# Patient Record
Sex: MALE | Race: WHITE | NOT HISPANIC OR LATINO | Employment: OTHER | URBAN - METROPOLITAN AREA
[De-identification: names, ages, dates, MRNs, and addresses within clinical notes are randomized per-mention and may not be internally consistent; named-entity substitution may affect disease eponyms.]

---

## 2021-01-01 ENCOUNTER — APPOINTMENT (OUTPATIENT)
Dept: CT IMAGING | Facility: CLINIC | Age: 71
End: 2021-01-01
Attending: EMERGENCY MEDICINE
Payer: COMMERCIAL

## 2021-01-01 ENCOUNTER — APPOINTMENT (OUTPATIENT)
Dept: GENERAL RADIOLOGY | Facility: CLINIC | Age: 71
End: 2021-01-01
Attending: EMERGENCY MEDICINE
Payer: COMMERCIAL

## 2021-01-01 ENCOUNTER — APPOINTMENT (OUTPATIENT)
Dept: ULTRASOUND IMAGING | Facility: CLINIC | Age: 71
DRG: 270 | End: 2021-01-01
Attending: INTERNAL MEDICINE
Payer: COMMERCIAL

## 2021-01-01 ENCOUNTER — APPOINTMENT (OUTPATIENT)
Dept: NEUROLOGY | Facility: CLINIC | Age: 71
DRG: 270 | End: 2021-01-01
Attending: STUDENT IN AN ORGANIZED HEALTH CARE EDUCATION/TRAINING PROGRAM
Payer: COMMERCIAL

## 2021-01-01 ENCOUNTER — HOSPITAL ENCOUNTER (EMERGENCY)
Facility: CLINIC | Age: 71
Discharge: ANOTHER HOSPITAL | End: 2021-12-11
Attending: EMERGENCY MEDICINE | Admitting: EMERGENCY MEDICINE
Payer: COMMERCIAL

## 2021-01-01 ENCOUNTER — APPOINTMENT (OUTPATIENT)
Dept: GENERAL RADIOLOGY | Facility: CLINIC | Age: 71
DRG: 270 | End: 2021-01-01
Attending: INTERNAL MEDICINE
Payer: COMMERCIAL

## 2021-01-01 ENCOUNTER — APPOINTMENT (OUTPATIENT)
Dept: ULTRASOUND IMAGING | Facility: CLINIC | Age: 71
DRG: 270 | End: 2021-01-01
Attending: STUDENT IN AN ORGANIZED HEALTH CARE EDUCATION/TRAINING PROGRAM
Payer: COMMERCIAL

## 2021-01-01 ENCOUNTER — APPOINTMENT (OUTPATIENT)
Dept: CARDIOLOGY | Facility: CLINIC | Age: 71
DRG: 270 | End: 2021-01-01
Attending: STUDENT IN AN ORGANIZED HEALTH CARE EDUCATION/TRAINING PROGRAM
Payer: COMMERCIAL

## 2021-01-01 ENCOUNTER — HOSPITAL ENCOUNTER (INPATIENT)
Facility: CLINIC | Age: 71
LOS: 1 days | DRG: 270 | End: 2021-12-12
Attending: INTERNAL MEDICINE | Admitting: INTERNAL MEDICINE
Payer: COMMERCIAL

## 2021-01-01 ENCOUNTER — APPOINTMENT (OUTPATIENT)
Dept: GENERAL RADIOLOGY | Facility: CLINIC | Age: 71
DRG: 270 | End: 2021-01-01
Attending: STUDENT IN AN ORGANIZED HEALTH CARE EDUCATION/TRAINING PROGRAM
Payer: COMMERCIAL

## 2021-01-01 VITALS
TEMPERATURE: 91.8 F | HEART RATE: 88 BPM | DIASTOLIC BLOOD PRESSURE: 58 MMHG | OXYGEN SATURATION: 100 % | RESPIRATION RATE: 16 BRPM | WEIGHT: 154.32 LBS | SYSTOLIC BLOOD PRESSURE: 91 MMHG

## 2021-01-01 VITALS
OXYGEN SATURATION: 73 % | RESPIRATION RATE: 24 BRPM | BODY MASS INDEX: 21.29 KG/M2 | TEMPERATURE: 95.9 F | SYSTOLIC BLOOD PRESSURE: 113 MMHG | HEIGHT: 72 IN | WEIGHT: 157.19 LBS | HEART RATE: 81 BPM | DIASTOLIC BLOOD PRESSURE: 71 MMHG

## 2021-01-01 DIAGNOSIS — R57.0 CARDIOGENIC SHOCK (H): Primary | ICD-10-CM

## 2021-01-01 DIAGNOSIS — I46.9 CARDIAC ARREST (H): ICD-10-CM

## 2021-01-01 LAB
ABO/RH(D): NORMAL
ABO/RH(D): NORMAL
ALBUMIN SERPL-MCNC: 1.5 G/DL (ref 3.4–5)
ALBUMIN SERPL-MCNC: 1.6 G/DL (ref 3.4–5)
ALBUMIN SERPL-MCNC: 1.6 G/DL (ref 3.4–5)
ALBUMIN SERPL-MCNC: 1.8 G/DL (ref 3.4–5)
ALBUMIN SERPL-MCNC: 2 G/DL (ref 3.4–5)
ALBUMIN SERPL-MCNC: 2.1 G/DL (ref 3.4–5)
ALBUMIN SERPL-MCNC: 2.3 G/DL (ref 3.4–5)
ALBUMIN SERPL-MCNC: 2.3 G/DL (ref 3.4–5)
ALBUMIN SERPL-MCNC: 2.4 G/DL (ref 3.4–5)
ALBUMIN SERPL-MCNC: 2.4 G/DL (ref 3.4–5)
ALBUMIN SERPL-MCNC: 2.6 G/DL (ref 3.4–5)
ALBUMIN SERPL-MCNC: 2.7 G/DL (ref 3.4–5)
ALBUMIN SERPL-MCNC: 3 G/DL (ref 3.4–5)
ALBUMIN SERPL-MCNC: 3.2 G/DL (ref 3.4–5)
ALBUMIN UR-MCNC: 30 MG/DL
ALBUMIN UR-MCNC: 70 MG/DL
ALP SERPL-CCNC: 105 U/L (ref 40–150)
ALP SERPL-CCNC: 120 U/L (ref 40–150)
ALP SERPL-CCNC: 127 U/L (ref 40–150)
ALP SERPL-CCNC: 37 U/L (ref 40–150)
ALP SERPL-CCNC: 47 U/L (ref 40–150)
ALP SERPL-CCNC: 49 U/L (ref 40–150)
ALP SERPL-CCNC: 55 U/L (ref 40–150)
ALP SERPL-CCNC: 56 U/L (ref 40–150)
ALP SERPL-CCNC: 59 U/L (ref 40–150)
ALP SERPL-CCNC: 64 U/L (ref 40–150)
ALP SERPL-CCNC: 66 U/L (ref 40–150)
ALP SERPL-CCNC: 68 U/L (ref 40–150)
ALP SERPL-CCNC: 73 U/L (ref 40–150)
ALP SERPL-CCNC: 90 U/L (ref 40–150)
ALP SERPL-CCNC: 98 U/L (ref 40–150)
ALT SERPL W P-5'-P-CCNC: 1459 U/L (ref 0–70)
ALT SERPL W P-5'-P-CCNC: 1487 U/L (ref 0–70)
ALT SERPL W P-5'-P-CCNC: 1957 U/L (ref 0–70)
ALT SERPL W P-5'-P-CCNC: 1980 U/L (ref 0–70)
ALT SERPL W P-5'-P-CCNC: 2413 U/L (ref 0–70)
ALT SERPL W P-5'-P-CCNC: 2481 U/L (ref 0–70)
ALT SERPL W P-5'-P-CCNC: 2488 U/L (ref 0–70)
ALT SERPL W P-5'-P-CCNC: 3174 U/L (ref 0–70)
ALT SERPL W P-5'-P-CCNC: 4043 U/L (ref 0–70)
ALT SERPL W P-5'-P-CCNC: 4043 U/L (ref 0–70)
ALT SERPL W P-5'-P-CCNC: 43 U/L (ref 0–70)
ALT SERPL W P-5'-P-CCNC: 50 U/L (ref 0–70)
ALT SERPL W P-5'-P-CCNC: 6950 U/L (ref 0–70)
ALT SERPL W P-5'-P-CCNC: 7492 U/L (ref 0–70)
ALT SERPL W P-5'-P-CCNC: 8643 U/L (ref 0–70)
AMPHETAMINES UR QL SCN: ABNORMAL
ANION GAP SERPL CALCULATED.3IONS-SCNC: 14 MMOL/L (ref 3–14)
ANION GAP SERPL CALCULATED.3IONS-SCNC: 17 MMOL/L (ref 3–14)
ANION GAP SERPL CALCULATED.3IONS-SCNC: 20 MMOL/L (ref 3–14)
ANION GAP SERPL CALCULATED.3IONS-SCNC: 21 MMOL/L (ref 3–14)
ANION GAP SERPL CALCULATED.3IONS-SCNC: 22 MMOL/L (ref 3–14)
ANION GAP SERPL CALCULATED.3IONS-SCNC: 23 MMOL/L (ref 3–14)
ANION GAP SERPL CALCULATED.3IONS-SCNC: 24 MMOL/L (ref 3–14)
ANION GAP SERPL CALCULATED.3IONS-SCNC: 24 MMOL/L (ref 3–14)
ANION GAP SERPL CALCULATED.3IONS-SCNC: 25 MMOL/L (ref 3–14)
ANION GAP SERPL CALCULATED.3IONS-SCNC: 28 MMOL/L (ref 3–14)
ANION GAP SERPL CALCULATED.3IONS-SCNC: 29 MMOL/L (ref 3–14)
ANION GAP SERPL CALCULATED.3IONS-SCNC: 29 MMOL/L (ref 3–14)
ANION GAP SERPL CALCULATED.3IONS-SCNC: 7 MMOL/L (ref 3–14)
ANION GAP SERPL CALCULATED.3IONS-SCNC: 7 MMOL/L (ref 3–14)
ANTIBODY SCREEN: NEGATIVE
ANTIBODY SCREEN: NEGATIVE
APPEARANCE UR: ABNORMAL
APPEARANCE UR: CLEAR
APTT PPP: 40 SECONDS (ref 22–38)
APTT PPP: 42 SECONDS (ref 22–38)
APTT PPP: >240 SECONDS (ref 22–38)
AST SERPL W P-5'-P-CCNC: 108 U/L (ref 0–45)
AST SERPL W P-5'-P-CCNC: 1563 U/L (ref 0–45)
AST SERPL W P-5'-P-CCNC: 2136 U/L (ref 0–45)
AST SERPL W P-5'-P-CCNC: 2514 U/L (ref 0–45)
AST SERPL W P-5'-P-CCNC: 2863 U/L (ref 0–45)
AST SERPL W P-5'-P-CCNC: 3082 U/L (ref 0–45)
AST SERPL W P-5'-P-CCNC: 3436 U/L (ref 0–45)
AST SERPL W P-5'-P-CCNC: 4128 U/L (ref 0–45)
AST SERPL W P-5'-P-CCNC: 47 U/L (ref 0–45)
AST SERPL W P-5'-P-CCNC: 5389 U/L (ref 0–45)
AST SERPL W P-5'-P-CCNC: 7004 U/L (ref 0–45)
AST SERPL W P-5'-P-CCNC: 7004 U/L (ref 0–45)
AST SERPL W P-5'-P-CCNC: ABNORMAL U/L (ref 0–45)
BACTERIA UR CULT: NO GROWTH
BARBITURATES UR QL: ABNORMAL
BASE EXCESS BLDA CALC-SCNC: -10.1 MMOL/L (ref -9–1.8)
BASE EXCESS BLDA CALC-SCNC: -10.5 MMOL/L (ref -9–1.8)
BASE EXCESS BLDA CALC-SCNC: -10.8 MMOL/L (ref -9–1.8)
BASE EXCESS BLDA CALC-SCNC: -10.9 MMOL/L (ref -9.6–2)
BASE EXCESS BLDA CALC-SCNC: -11 MMOL/L (ref -9–1.8)
BASE EXCESS BLDA CALC-SCNC: -11.4 MMOL/L (ref -9.6–2)
BASE EXCESS BLDA CALC-SCNC: -12 MMOL/L (ref -9–1.8)
BASE EXCESS BLDA CALC-SCNC: -13.2 MMOL/L (ref -9–1.8)
BASE EXCESS BLDA CALC-SCNC: -13.4 MMOL/L (ref -9–1.8)
BASE EXCESS BLDA CALC-SCNC: -13.7 MMOL/L (ref -9–1.8)
BASE EXCESS BLDA CALC-SCNC: -14 MMOL/L (ref -9–1.8)
BASE EXCESS BLDA CALC-SCNC: -15.2 MMOL/L (ref -9–1.8)
BASE EXCESS BLDA CALC-SCNC: -15.2 MMOL/L (ref -9–1.8)
BASE EXCESS BLDA CALC-SCNC: -15.5 MMOL/L (ref -9–1.8)
BASE EXCESS BLDA CALC-SCNC: -15.8 MMOL/L (ref -9–1.8)
BASE EXCESS BLDA CALC-SCNC: -16.1 MMOL/L (ref -9–1.8)
BASE EXCESS BLDA CALC-SCNC: -16.3 MMOL/L (ref -9–1.8)
BASE EXCESS BLDA CALC-SCNC: -19 MMOL/L (ref -9–1.8)
BASE EXCESS BLDA CALC-SCNC: -9.3 MMOL/L (ref -9–1.8)
BASE EXCESS BLDA CALC-SCNC: -9.5 MMOL/L (ref -9–1.8)
BASE EXCESS BLDV CALC-SCNC: -10.2 MMOL/L (ref -7.7–1.9)
BASE EXCESS BLDV CALC-SCNC: -11.2 MMOL/L (ref -7.7–1.9)
BASE EXCESS BLDV CALC-SCNC: -14.4 MMOL/L (ref -7.7–1.9)
BASE EXCESS BLDV CALC-SCNC: -15.9 MMOL/L (ref -7.7–1.9)
BASE EXCESS BLDV CALC-SCNC: -6.9 MMOL/L (ref -7.7–1.9)
BASE EXCESS BLDV CALC-SCNC: -9.7 MMOL/L (ref -7.7–1.9)
BASOPHILS # BLD AUTO: 0 10E3/UL (ref 0–0.2)
BASOPHILS # BLD AUTO: 0.1 10E3/UL (ref 0–0.2)
BASOPHILS NFR BLD AUTO: 1 %
BASOPHILS NFR BLD AUTO: 1 %
BENZODIAZ UR QL: ABNORMAL
BI-PLANE LVEF ECHO: NORMAL
BILIRUB DIRECT SERPL-MCNC: 0.1 MG/DL (ref 0–0.2)
BILIRUB DIRECT SERPL-MCNC: 0.5 MG/DL (ref 0–0.2)
BILIRUB SERPL-MCNC: 0.4 MG/DL (ref 0.2–1.3)
BILIRUB SERPL-MCNC: 0.6 MG/DL (ref 0.2–1.3)
BILIRUB SERPL-MCNC: 0.8 MG/DL (ref 0.2–1.3)
BILIRUB SERPL-MCNC: 0.8 MG/DL (ref 0.2–1.3)
BILIRUB SERPL-MCNC: 1 MG/DL (ref 0.2–1.3)
BILIRUB SERPL-MCNC: 1.1 MG/DL (ref 0.2–1.3)
BILIRUB SERPL-MCNC: 1.4 MG/DL (ref 0.2–1.3)
BILIRUB SERPL-MCNC: 1.5 MG/DL (ref 0.2–1.3)
BILIRUB SERPL-MCNC: 1.5 MG/DL (ref 0.2–1.3)
BILIRUB SERPL-MCNC: 1.6 MG/DL (ref 0.2–1.3)
BILIRUB SERPL-MCNC: 1.7 MG/DL (ref 0.2–1.3)
BILIRUB SERPL-MCNC: 1.8 MG/DL (ref 0.2–1.3)
BILIRUB SERPL-MCNC: 2 MG/DL (ref 0.2–1.3)
BILIRUB UR QL STRIP: NEGATIVE
BILIRUB UR QL STRIP: NEGATIVE
BLD PROD TYP BPU: NORMAL
BLOOD COMPONENT TYPE: NORMAL
BUN SERPL-MCNC: 17 MG/DL (ref 7–30)
BUN SERPL-MCNC: 18 MG/DL (ref 7–30)
BUN SERPL-MCNC: 18 MG/DL (ref 7–30)
BUN SERPL-MCNC: 20 MG/DL (ref 7–30)
BUN SERPL-MCNC: 21 MG/DL (ref 7–30)
BUN SERPL-MCNC: 22 MG/DL (ref 7–30)
BUN SERPL-MCNC: 23 MG/DL (ref 7–30)
BUN SERPL-MCNC: 23 MG/DL (ref 7–30)
BUN SERPL-MCNC: 25 MG/DL (ref 7–30)
BUN SERPL-MCNC: 26 MG/DL (ref 7–30)
BUN SERPL-MCNC: 30 MG/DL (ref 7–30)
BUN SERPL-MCNC: 31 MG/DL (ref 7–30)
BUN SERPL-MCNC: 32 MG/DL (ref 7–30)
CA-I BLD-MCNC: 3.4 MG/DL (ref 4.4–5.2)
CA-I BLD-MCNC: 3.7 MG/DL (ref 4.4–5.2)
CA-I BLD-MCNC: 4 MG/DL (ref 4.4–5.2)
CA-I BLD-MCNC: 4 MG/DL (ref 4.4–5.2)
CA-I BLD-MCNC: 4.1 MG/DL (ref 4.4–5.2)
CA-I BLD-MCNC: 4.4 MG/DL (ref 4.4–5.2)
CA-I BLD-MCNC: 4.5 MG/DL (ref 4.4–5.2)
CALCIUM SERPL-MCNC: 5.2 MG/DL (ref 8.5–10.1)
CALCIUM SERPL-MCNC: 5.8 MG/DL (ref 8.5–10.1)
CALCIUM SERPL-MCNC: 5.9 MG/DL (ref 8.5–10.1)
CALCIUM SERPL-MCNC: 6.2 MG/DL (ref 8.5–10.1)
CALCIUM SERPL-MCNC: 6.3 MG/DL (ref 8.5–10.1)
CALCIUM SERPL-MCNC: 6.3 MG/DL (ref 8.5–10.1)
CALCIUM SERPL-MCNC: 6.4 MG/DL (ref 8.5–10.1)
CALCIUM SERPL-MCNC: 6.6 MG/DL (ref 8.5–10.1)
CALCIUM SERPL-MCNC: 7.2 MG/DL (ref 8.5–10.1)
CALCIUM SERPL-MCNC: 7.3 MG/DL (ref 8.5–10.1)
CALCIUM SERPL-MCNC: 7.3 MG/DL (ref 8.5–10.1)
CALCIUM SERPL-MCNC: 7.5 MG/DL (ref 8.5–10.1)
CALCIUM SERPL-MCNC: 7.7 MG/DL (ref 8.5–10.1)
CALCIUM SERPL-MCNC: 8.1 MG/DL (ref 8.5–10.1)
CALCIUM SERPL-MCNC: 8.3 MG/DL (ref 8.5–10.1)
CANNABINOIDS UR QL SCN: ABNORMAL
CF REDUC 30M P MA P HEP LENFR BLD TEG: 0 % (ref 0–8)
CF REDUC 60M P MA P HEPASE LENFR BLD TEG: 0.5 % (ref 0–15)
CFT P HPASE BLD TEG: 1.2 MINUTE (ref 1–3)
CHLORIDE BLD-SCNC: 101 MMOL/L (ref 94–109)
CHLORIDE BLD-SCNC: 101 MMOL/L (ref 94–109)
CHLORIDE BLD-SCNC: 104 MMOL/L (ref 94–109)
CHLORIDE BLD-SCNC: 105 MMOL/L (ref 94–109)
CHLORIDE BLD-SCNC: 106 MMOL/L (ref 94–109)
CHLORIDE BLD-SCNC: 106 MMOL/L (ref 94–109)
CHLORIDE BLD-SCNC: 107 MMOL/L (ref 94–109)
CHLORIDE BLD-SCNC: 108 MMOL/L (ref 94–109)
CHLORIDE BLD-SCNC: 109 MMOL/L (ref 94–109)
CHLORIDE BLD-SCNC: 109 MMOL/L (ref 94–109)
CHLORIDE BLD-SCNC: 110 MMOL/L (ref 94–109)
CHLORIDE BLD-SCNC: 111 MMOL/L (ref 94–109)
CHLORIDE BLD-SCNC: 116 MMOL/L (ref 94–109)
CHLORIDE BLD-SCNC: 118 MMOL/L (ref 94–109)
CHLORIDE BLD-SCNC: 119 MMOL/L (ref 94–109)
CHLORIDE BLD-SCNC: 96 MMOL/L (ref 94–109)
CHLORIDE BLD-SCNC: 98 MMOL/L (ref 94–109)
CHLORIDE BLD-SCNC: 99 MMOL/L (ref 94–109)
CI (COAGULATION INDEX)(Z): 1.4 (ref -3–3)
CLOT ANGLE P HPASE BLD TEG: 71.4 DEGREES (ref 53–72)
CLOT INIT P HPASE BLD TEG: 4.5 MINUTE (ref 5–10)
CLOT STRENGTH P HPASE BLD TEG: 7.1 KD/SC (ref 4.5–11)
CO2 SERPL-SCNC: 11 MMOL/L (ref 20–32)
CO2 SERPL-SCNC: 12 MMOL/L (ref 20–32)
CO2 SERPL-SCNC: 15 MMOL/L (ref 20–32)
CO2 SERPL-SCNC: 16 MMOL/L (ref 20–32)
CO2 SERPL-SCNC: 17 MMOL/L (ref 20–32)
CO2 SERPL-SCNC: 18 MMOL/L (ref 20–32)
CO2 SERPL-SCNC: 19 MMOL/L (ref 20–32)
CO2 SERPL-SCNC: 20 MMOL/L (ref 20–32)
CO2 SERPL-SCNC: 21 MMOL/L (ref 20–32)
COCAINE UR QL: ABNORMAL
CODING SYSTEM: NORMAL
COLOR UR AUTO: ABNORMAL
COLOR UR AUTO: ABNORMAL
CORTIS SERPL-MCNC: 22.2 UG/DL (ref 4–22)
CREAT SERPL-MCNC: 1.24 MG/DL (ref 0.66–1.25)
CREAT SERPL-MCNC: 1.35 MG/DL (ref 0.66–1.25)
CREAT SERPL-MCNC: 1.39 MG/DL (ref 0.66–1.25)
CREAT SERPL-MCNC: 1.45 MG/DL (ref 0.66–1.25)
CREAT SERPL-MCNC: 1.55 MG/DL (ref 0.66–1.25)
CREAT SERPL-MCNC: 1.59 MG/DL (ref 0.66–1.25)
CREAT SERPL-MCNC: 1.59 MG/DL (ref 0.66–1.25)
CREAT SERPL-MCNC: 1.64 MG/DL (ref 0.66–1.25)
CREAT SERPL-MCNC: 1.66 MG/DL (ref 0.66–1.25)
CREAT SERPL-MCNC: 1.69 MG/DL (ref 0.66–1.25)
CREAT SERPL-MCNC: 1.74 MG/DL (ref 0.66–1.25)
CREAT SERPL-MCNC: 1.78 MG/DL (ref 0.66–1.25)
CREAT SERPL-MCNC: 1.84 MG/DL (ref 0.66–1.25)
CREAT SERPL-MCNC: 1.89 MG/DL (ref 0.66–1.25)
CREAT SERPL-MCNC: 1.95 MG/DL (ref 0.66–1.25)
CREAT SERPL-MCNC: 2.38 MG/DL (ref 0.66–1.25)
CREAT SERPL-MCNC: 2.46 MG/DL (ref 0.66–1.25)
CREAT SERPL-MCNC: 2.5 MG/DL (ref 0.66–1.25)
CRP SERPL-MCNC: 18 MG/L (ref 0–8)
CRP SERPL-MCNC: 4.4 MG/L (ref 0–8)
EOSINOPHIL # BLD AUTO: 0 10E3/UL (ref 0–0.7)
EOSINOPHIL # BLD AUTO: 0.2 10E3/UL (ref 0–0.7)
EOSINOPHIL NFR BLD AUTO: 1 %
EOSINOPHIL NFR BLD AUTO: 2 %
ERYTHROCYTE [DISTWIDTH] IN BLOOD BY AUTOMATED COUNT: 13.2 % (ref 10–15)
ERYTHROCYTE [DISTWIDTH] IN BLOOD BY AUTOMATED COUNT: 13.3 % (ref 10–15)
ERYTHROCYTE [DISTWIDTH] IN BLOOD BY AUTOMATED COUNT: 13.4 % (ref 10–15)
ERYTHROCYTE [DISTWIDTH] IN BLOOD BY AUTOMATED COUNT: 13.5 % (ref 10–15)
ERYTHROCYTE [DISTWIDTH] IN BLOOD BY AUTOMATED COUNT: 13.8 % (ref 10–15)
ERYTHROCYTE [SEDIMENTATION RATE] IN BLOOD BY WESTERGREN METHOD: 3 MM/HR (ref 0–20)
ERYTHROCYTE [SEDIMENTATION RATE] IN BLOOD BY WESTERGREN METHOD: 9 MM/HR (ref 0–20)
ETHANOL UR QL SCN: ABNORMAL
FIBRINOGEN PPP-MCNC: 151 MG/DL (ref 170–490)
FIBRINOGEN PPP-MCNC: <61 MG/DL (ref 170–490)
GFR SERPL CREATININE-BSD FRML MDRD: 25 ML/MIN/1.73M2
GFR SERPL CREATININE-BSD FRML MDRD: 26 ML/MIN/1.73M2
GFR SERPL CREATININE-BSD FRML MDRD: 27 ML/MIN/1.73M2
GFR SERPL CREATININE-BSD FRML MDRD: 34 ML/MIN/1.73M2
GFR SERPL CREATININE-BSD FRML MDRD: 35 ML/MIN/1.73M2
GFR SERPL CREATININE-BSD FRML MDRD: 37 ML/MIN/1.73M2
GFR SERPL CREATININE-BSD FRML MDRD: 38 ML/MIN/1.73M2
GFR SERPL CREATININE-BSD FRML MDRD: 39 ML/MIN/1.73M2
GFR SERPL CREATININE-BSD FRML MDRD: 41 ML/MIN/1.73M2
GFR SERPL CREATININE-BSD FRML MDRD: 41 ML/MIN/1.73M2
GFR SERPL CREATININE-BSD FRML MDRD: 42 ML/MIN/1.73M2
GFR SERPL CREATININE-BSD FRML MDRD: 44 ML/MIN/1.73M2
GFR SERPL CREATININE-BSD FRML MDRD: 44 ML/MIN/1.73M2
GFR SERPL CREATININE-BSD FRML MDRD: 45 ML/MIN/1.73M2
GFR SERPL CREATININE-BSD FRML MDRD: 49 ML/MIN/1.73M2
GFR SERPL CREATININE-BSD FRML MDRD: 51 ML/MIN/1.73M2
GFR SERPL CREATININE-BSD FRML MDRD: 53 ML/MIN/1.73M2
GFR SERPL CREATININE-BSD FRML MDRD: 59 ML/MIN/1.73M2
GLUCOSE BLD-MCNC: 103 MG/DL (ref 70–99)
GLUCOSE BLD-MCNC: 104 MG/DL (ref 70–99)
GLUCOSE BLD-MCNC: 109 MG/DL (ref 70–99)
GLUCOSE BLD-MCNC: 111 MG/DL (ref 70–99)
GLUCOSE BLD-MCNC: 113 MG/DL (ref 70–99)
GLUCOSE BLD-MCNC: 116 MG/DL (ref 70–99)
GLUCOSE BLD-MCNC: 120 MG/DL (ref 70–99)
GLUCOSE BLD-MCNC: 121 MG/DL (ref 70–99)
GLUCOSE BLD-MCNC: 123 MG/DL (ref 70–99)
GLUCOSE BLD-MCNC: 123 MG/DL (ref 70–99)
GLUCOSE BLD-MCNC: 125 MG/DL (ref 70–99)
GLUCOSE BLD-MCNC: 137 MG/DL (ref 70–99)
GLUCOSE BLD-MCNC: 144 MG/DL (ref 70–99)
GLUCOSE BLD-MCNC: 151 MG/DL (ref 70–99)
GLUCOSE BLD-MCNC: 164 MG/DL (ref 70–99)
GLUCOSE BLD-MCNC: 193 MG/DL (ref 70–99)
GLUCOSE BLD-MCNC: 225 MG/DL (ref 70–99)
GLUCOSE BLD-MCNC: 283 MG/DL (ref 70–99)
GLUCOSE BLD-MCNC: 36 MG/DL (ref 70–99)
GLUCOSE BLD-MCNC: 48 MG/DL (ref 70–99)
GLUCOSE BLD-MCNC: 51 MG/DL (ref 70–99)
GLUCOSE BLD-MCNC: 61 MG/DL (ref 70–99)
GLUCOSE BLD-MCNC: 67 MG/DL (ref 70–99)
GLUCOSE BLD-MCNC: 82 MG/DL (ref 70–99)
GLUCOSE BLDC GLUCOMTR-MCNC: 107 MG/DL (ref 70–99)
GLUCOSE BLDC GLUCOMTR-MCNC: 114 MG/DL (ref 70–99)
GLUCOSE BLDC GLUCOMTR-MCNC: 120 MG/DL (ref 70–99)
GLUCOSE BLDC GLUCOMTR-MCNC: 120 MG/DL (ref 70–99)
GLUCOSE BLDC GLUCOMTR-MCNC: 30 MG/DL (ref 70–99)
GLUCOSE BLDC GLUCOMTR-MCNC: 62 MG/DL (ref 70–99)
GLUCOSE BLDC GLUCOMTR-MCNC: 74 MG/DL (ref 70–99)
GLUCOSE BLDC GLUCOMTR-MCNC: 83 MG/DL (ref 70–99)
GLUCOSE BLDC GLUCOMTR-MCNC: 93 MG/DL (ref 70–99)
GLUCOSE BLDC GLUCOMTR-MCNC: 93 MG/DL (ref 70–99)
GLUCOSE BLDC GLUCOMTR-MCNC: 94 MG/DL (ref 70–99)
GLUCOSE UR STRIP-MCNC: 100 MG/DL
GLUCOSE UR STRIP-MCNC: 70 MG/DL
HCO3 BLD-SCNC: 10 MMOL/L (ref 21–28)
HCO3 BLD-SCNC: 11 MMOL/L (ref 21–28)
HCO3 BLD-SCNC: 13 MMOL/L (ref 21–28)
HCO3 BLD-SCNC: 14 MMOL/L (ref 21–28)
HCO3 BLD-SCNC: 14 MMOL/L (ref 21–28)
HCO3 BLD-SCNC: 15 MMOL/L (ref 21–28)
HCO3 BLD-SCNC: 16 MMOL/L (ref 21–28)
HCO3 BLD-SCNC: 17 MMOL/L (ref 21–28)
HCO3 BLD-SCNC: 18 MMOL/L (ref 21–28)
HCO3 BLD-SCNC: 20 MMOL/L (ref 21–28)
HCO3 BLD-SCNC: 21 MMOL/L (ref 21–28)
HCO3 BLD-SCNC: 21 MMOL/L (ref 21–28)
HCO3 BLDA-SCNC: 17 MMOL/L (ref 21–28)
HCO3 BLDA-SCNC: 19 MMOL/L (ref 21–28)
HCO3 BLDV-SCNC: 15 MMOL/L (ref 21–28)
HCO3 BLDV-SCNC: 17 MMOL/L (ref 21–28)
HCO3 BLDV-SCNC: 20 MMOL/L (ref 21–28)
HCO3 BLDV-SCNC: 20 MMOL/L (ref 21–28)
HCO3 BLDV-SCNC: 21 MMOL/L (ref 21–28)
HCO3 BLDV-SCNC: 26 MMOL/L (ref 21–28)
HCT VFR BLD AUTO: 29.3 % (ref 40–53)
HCT VFR BLD AUTO: 34.8 % (ref 40–53)
HCT VFR BLD AUTO: 38.9 % (ref 40–53)
HCT VFR BLD AUTO: 44.5 % (ref 40–53)
HCT VFR BLD AUTO: 47.7 % (ref 40–53)
HGB BLD-MCNC: 10.9 G/DL (ref 13.3–17.7)
HGB BLD-MCNC: 12 G/DL (ref 13.3–17.7)
HGB BLD-MCNC: 12.5 G/DL (ref 13.3–17.7)
HGB BLD-MCNC: 12.5 G/DL (ref 13.3–17.7)
HGB BLD-MCNC: 13.5 G/DL (ref 13.3–17.7)
HGB BLD-MCNC: 13.8 G/DL (ref 13.3–17.7)
HGB BLD-MCNC: 15.7 G/DL (ref 13.3–17.7)
HGB BLD-MCNC: 9.2 G/DL (ref 13.3–17.7)
HGB UR QL STRIP: ABNORMAL
HGB UR QL STRIP: ABNORMAL
HOLD SPECIMEN: NORMAL
IMM GRANULOCYTES # BLD: 0 10E3/UL
IMM GRANULOCYTES # BLD: 0.4 10E3/UL
IMM GRANULOCYTES NFR BLD: 0 %
IMM GRANULOCYTES NFR BLD: 4 %
INR PPP: 1.24 (ref 0.85–1.15)
INR PPP: 2 (ref 0.85–1.15)
INR PPP: >10 (ref 0.85–1.15)
ISSUE DATE AND TIME: NORMAL
KETONES UR STRIP-MCNC: NEGATIVE MG/DL
KETONES UR STRIP-MCNC: NEGATIVE MG/DL
LACTATE BLD-SCNC: 11.2 MMOL/L
LACTATE BLD-SCNC: 8.6 MMOL/L
LACTATE SERPL-SCNC: 1.3 MMOL/L (ref 0.7–2)
LACTATE SERPL-SCNC: 1.9 MMOL/L (ref 0.7–2)
LACTATE SERPL-SCNC: 10.2 MMOL/L (ref 0.7–2)
LACTATE SERPL-SCNC: 15 MMOL/L (ref 0.7–2)
LACTATE SERPL-SCNC: 16 MMOL/L (ref 0.7–2)
LACTATE SERPL-SCNC: 16 MMOL/L (ref 0.7–2)
LACTATE SERPL-SCNC: 17 MMOL/L (ref 0.7–2)
LACTATE SERPL-SCNC: 18 MMOL/L (ref 0.7–2)
LACTATE SERPL-SCNC: 20 MMOL/L (ref 0.7–2)
LACTATE SERPL-SCNC: 21 MMOL/L (ref 0.7–2)
LACTATE SERPL-SCNC: 23 MMOL/L (ref 0.7–2)
LACTATE SERPL-SCNC: 25 MMOL/L (ref 0.7–2)
LACTATE SERPL-SCNC: 25 MMOL/L (ref 0.7–2)
LACTATE SERPL-SCNC: 5.5 MMOL/L (ref 0.7–2)
LACTATE SERPL-SCNC: 7.1 MMOL/L (ref 0.7–2)
LACTATE SERPL-SCNC: 8.6 MMOL/L (ref 0.7–2)
LDH SERPL L TO P-CCNC: 491 U/L (ref 85–227)
LEUKOCYTE ESTERASE UR QL STRIP: ABNORMAL
LEUKOCYTE ESTERASE UR QL STRIP: NEGATIVE
LVEF ECHO: NORMAL
LYMPHOCYTES # BLD AUTO: 1 10E3/UL (ref 0.8–5.3)
LYMPHOCYTES # BLD AUTO: 4.4 10E3/UL (ref 0.8–5.3)
LYMPHOCYTES NFR BLD AUTO: 23 %
LYMPHOCYTES NFR BLD AUTO: 42 %
MAGNESIUM SERPL-MCNC: 2 MG/DL (ref 1.6–2.3)
MAGNESIUM SERPL-MCNC: 2.3 MG/DL (ref 1.6–2.3)
MAGNESIUM SERPL-MCNC: 2.5 MG/DL (ref 1.6–2.3)
MAGNESIUM SERPL-MCNC: 2.6 MG/DL (ref 1.6–2.3)
MCF P HPASE BLD TEG: 58.8 MM (ref 50–70)
MCH RBC QN AUTO: 32.9 PG (ref 26.5–33)
MCH RBC QN AUTO: 33.1 PG (ref 26.5–33)
MCH RBC QN AUTO: 33.1 PG (ref 26.5–33)
MCH RBC QN AUTO: 33.7 PG (ref 26.5–33)
MCH RBC QN AUTO: 33.8 PG (ref 26.5–33)
MCHC RBC AUTO-ENTMCNC: 30.3 G/DL (ref 31.5–36.5)
MCHC RBC AUTO-ENTMCNC: 30.8 G/DL (ref 31.5–36.5)
MCHC RBC AUTO-ENTMCNC: 31.3 G/DL (ref 31.5–36.5)
MCHC RBC AUTO-ENTMCNC: 31.4 G/DL (ref 31.5–36.5)
MCHC RBC AUTO-ENTMCNC: 32.9 G/DL (ref 31.5–36.5)
MCV RBC AUTO: 103 FL (ref 78–100)
MCV RBC AUTO: 106 FL (ref 78–100)
MCV RBC AUTO: 107 FL (ref 78–100)
MCV RBC AUTO: 107 FL (ref 78–100)
MCV RBC AUTO: 109 FL (ref 78–100)
MONOCYTES # BLD AUTO: 0.2 10E3/UL (ref 0–1.3)
MONOCYTES # BLD AUTO: 0.6 10E3/UL (ref 0–1.3)
MONOCYTES NFR BLD AUTO: 4 %
MONOCYTES NFR BLD AUTO: 5 %
MRSA DNA SPEC QL NAA+PROBE: ABNORMAL
MUCOUS THREADS #/AREA URNS LPF: PRESENT /LPF
NEUTROPHILS # BLD AUTO: 3.3 10E3/UL (ref 1.6–8.3)
NEUTROPHILS # BLD AUTO: 5.1 10E3/UL (ref 1.6–8.3)
NEUTROPHILS NFR BLD AUTO: 46 %
NEUTROPHILS NFR BLD AUTO: 71 %
NITRATE UR QL: NEGATIVE
NITRATE UR QL: NEGATIVE
NRBC # BLD AUTO: 0 10E3/UL
NRBC # BLD AUTO: 0 10E3/UL
NRBC BLD AUTO-RTO: 0 /100
NRBC BLD AUTO-RTO: 0 /100
NT-PROBNP SERPL-MCNC: 510 PG/ML (ref 0–900)
O2/TOTAL GAS SETTING VFR VENT: 50 %
O2/TOTAL GAS SETTING VFR VENT: 60 %
O2/TOTAL GAS SETTING VFR VENT: 70 %
O2/TOTAL GAS SETTING VFR VENT: 70 %
O2/TOTAL GAS SETTING VFR VENT: 80 %
O2/TOTAL GAS SETTING VFR VENT: 90 %
OPIATES UR QL SCN: ABNORMAL
OXYHGB MFR BLD: 97 % (ref 92–100)
OXYHGB MFR BLD: 97 % (ref 92–100)
OXYHGB MFR BLD: 99 % (ref 92–100)
OXYHGB MFR BLD: 99 % (ref 92–100)
OXYHGB MFR BLDA: 95 % (ref 92–100)
OXYHGB MFR BLDA: 97 % (ref 92–100)
OXYHGB MFR BLDV: 60 % (ref 70–75)
OXYHGB MFR BLDV: 63 % (ref 70–75)
OXYHGB MFR BLDV: 65 % (ref 70–75)
OXYHGB MFR BLDV: 65 % (ref 92–100)
OXYHGB MFR BLDV: 77 % (ref 70–75)
OXYHGB MFR BLDV: 78 % (ref 70–75)
OXYHGB MFR BLDV: 95 % (ref 70–75)
PCO2 BLD: 30 MM HG (ref 35–45)
PCO2 BLD: 36 MM HG (ref 35–45)
PCO2 BLD: 46 MM HG (ref 35–45)
PCO2 BLD: 48 MM HG (ref 35–45)
PCO2 BLD: 49 MM HG (ref 35–45)
PCO2 BLD: 50 MM HG (ref 35–45)
PCO2 BLD: 52 MM HG (ref 35–45)
PCO2 BLD: 53 MM HG (ref 35–45)
PCO2 BLD: 54 MM HG (ref 35–45)
PCO2 BLD: 56 MM HG (ref 35–45)
PCO2 BLD: 57 MM HG (ref 35–45)
PCO2 BLD: 58 MM HG (ref 35–45)
PCO2 BLD: 59 MM HG (ref 35–45)
PCO2 BLD: 63 MM HG (ref 35–45)
PCO2 BLD: 67 MM HG (ref 35–45)
PCO2 BLDA: 49 MM HG (ref 35–45)
PCO2 BLDA: 61 MM HG (ref 35–45)
PCO2 BLDV: 63 MM HG (ref 40–50)
PCO2 BLDV: 65 MM HG (ref 40–50)
PCO2 BLDV: 65 MM HG (ref 40–50)
PCO2 BLDV: 68 MM HG (ref 40–50)
PCO2 BLDV: 69 MM HG (ref 40–50)
PCO2 BLDV: 91 MM HG (ref 40–50)
PH BLD: 7.01 [PH] (ref 7.35–7.45)
PH BLD: 7.05 [PH] (ref 7.35–7.45)
PH BLD: 7.06 [PH] (ref 7.35–7.45)
PH BLD: 7.06 [PH] (ref 7.35–7.45)
PH BLD: 7.07 [PH] (ref 7.35–7.45)
PH BLD: 7.07 [PH] (ref 7.35–7.45)
PH BLD: 7.08 [PH] (ref 7.35–7.45)
PH BLD: 7.08 [PH] (ref 7.35–7.45)
PH BLD: 7.09 [PH] (ref 7.35–7.45)
PH BLD: 7.1 [PH] (ref 7.35–7.45)
PH BLD: 7.1 [PH] (ref 7.35–7.45)
PH BLD: 7.13 [PH] (ref 7.35–7.45)
PH BLD: 7.14 [PH] (ref 7.35–7.45)
PH BLD: 7.14 [PH] (ref 7.35–7.45)
PH BLD: 7.15 [PH] (ref 7.35–7.45)
PH BLD: 7.15 [PH] (ref 7.35–7.45)
PH BLD: 7.16 [PH] (ref 7.35–7.45)
PH BLD: 7.17 [PH] (ref 7.35–7.45)
PH BLDA: 7.11 [PH] (ref 7.35–7.45)
PH BLDA: 7.16 [PH] (ref 7.35–7.45)
PH BLDV: 6.98 [PH] (ref 7.32–7.43)
PH BLDV: 7.03 [PH] (ref 7.32–7.43)
PH BLDV: 7.07 [PH] (ref 7.32–7.43)
PH BLDV: 7.07 [PH] (ref 7.32–7.43)
PH BLDV: 7.09 [PH] (ref 7.32–7.43)
PH BLDV: 7.11 [PH] (ref 7.32–7.43)
PH UR STRIP: 6 [PH] (ref 5–7)
PH UR STRIP: 6.5 [PH] (ref 5–7)
PHOSPHATE SERPL-MCNC: 10.4 MG/DL (ref 2.5–4.5)
PHOSPHATE SERPL-MCNC: 8.4 MG/DL (ref 2.5–4.5)
PHOSPHATE SERPL-MCNC: 9.5 MG/DL (ref 2.5–4.5)
PLAT MORPH BLD: NORMAL
PLATELET # BLD AUTO: 189 10E3/UL (ref 150–450)
PLATELET # BLD AUTO: 193 10E3/UL (ref 150–450)
PLATELET # BLD AUTO: 26 10E3/UL (ref 150–450)
PLATELET # BLD AUTO: 77 10E3/UL (ref 150–450)
PLATELET # BLD AUTO: 77 10E3/UL (ref 150–450)
PO2 BLD: 101 MM HG (ref 80–105)
PO2 BLD: 112 MM HG (ref 80–105)
PO2 BLD: 115 MM HG (ref 80–105)
PO2 BLD: 128 MM HG (ref 80–105)
PO2 BLD: 128 MM HG (ref 80–105)
PO2 BLD: 134 MM HG (ref 80–105)
PO2 BLD: 143 MM HG (ref 80–105)
PO2 BLD: 145 MM HG (ref 80–105)
PO2 BLD: 149 MM HG (ref 80–105)
PO2 BLD: 201 MM HG (ref 80–105)
PO2 BLD: 226 MM HG (ref 80–105)
PO2 BLD: 266 MM HG (ref 80–105)
PO2 BLD: 280 MM HG (ref 80–105)
PO2 BLD: 327 MM HG (ref 80–105)
PO2 BLD: 346 MM HG (ref 80–105)
PO2 BLD: 375 MM HG (ref 80–105)
PO2 BLD: 90 MM HG (ref 80–105)
PO2 BLD: 93 MM HG (ref 80–105)
PO2 BLDA: 110 MM HG (ref 80–105)
PO2 BLDA: 122 MM HG (ref 80–105)
PO2 BLDV: 206 MM HG (ref 25–47)
PO2 BLDV: 40 MM HG (ref 25–47)
PO2 BLDV: 41 MM HG (ref 25–47)
PO2 BLDV: 44 MM HG (ref 25–47)
PO2 BLDV: 57 MM HG (ref 25–47)
PO2 BLDV: 61 MM HG (ref 25–47)
POTASSIUM BLD-SCNC: 3.2 MMOL/L (ref 3.4–5.3)
POTASSIUM BLD-SCNC: 3.8 MMOL/L (ref 3.5–5)
POTASSIUM BLD-SCNC: 4 MMOL/L (ref 3.5–5)
POTASSIUM BLD-SCNC: 4.1 MMOL/L (ref 3.4–5.3)
POTASSIUM BLD-SCNC: 4.1 MMOL/L (ref 3.4–5.3)
POTASSIUM BLD-SCNC: 4.2 MMOL/L (ref 3.4–5.3)
POTASSIUM BLD-SCNC: 4.3 MMOL/L (ref 3.4–5.3)
POTASSIUM BLD-SCNC: 4.3 MMOL/L (ref 3.4–5.3)
POTASSIUM BLD-SCNC: 4.4 MMOL/L (ref 3.4–5.3)
POTASSIUM BLD-SCNC: 4.7 MMOL/L (ref 3.4–5.3)
POTASSIUM BLD-SCNC: 5.2 MMOL/L (ref 3.4–5.3)
POTASSIUM BLD-SCNC: 5.3 MMOL/L (ref 3.4–5.3)
POTASSIUM BLD-SCNC: 5.4 MMOL/L (ref 3.4–5.3)
POTASSIUM BLD-SCNC: 5.4 MMOL/L (ref 3.4–5.3)
POTASSIUM BLD-SCNC: 5.8 MMOL/L (ref 3.4–5.3)
POTASSIUM BLD-SCNC: 6.1 MMOL/L (ref 3.4–5.3)
POTASSIUM BLD-SCNC: 6.1 MMOL/L (ref 3.4–5.3)
POTASSIUM BLD-SCNC: 6.7 MMOL/L (ref 3.4–5.3)
POTASSIUM BLD-SCNC: 6.9 MMOL/L (ref 3.4–5.3)
POTASSIUM BLD-SCNC: 7 MMOL/L (ref 3.4–5.3)
POTASSIUM BLD-SCNC: 7.1 MMOL/L (ref 3.4–5.3)
PROCALCITONIN SERPL-MCNC: 3.27 NG/ML
PROT SERPL-MCNC: 2.9 G/DL (ref 6.8–8.8)
PROT SERPL-MCNC: 3.3 G/DL (ref 6.8–8.8)
PROT SERPL-MCNC: 3.3 G/DL (ref 6.8–8.8)
PROT SERPL-MCNC: 3.6 G/DL (ref 6.8–8.8)
PROT SERPL-MCNC: 3.9 G/DL (ref 6.8–8.8)
PROT SERPL-MCNC: 4 G/DL (ref 6.8–8.8)
PROT SERPL-MCNC: 4.1 G/DL (ref 6.8–8.8)
PROT SERPL-MCNC: 4.2 G/DL (ref 6.8–8.8)
PROT SERPL-MCNC: 4.4 G/DL (ref 6.8–8.8)
PROT SERPL-MCNC: 4.4 G/DL (ref 6.8–8.8)
PROT SERPL-MCNC: 4.8 G/DL (ref 6.8–8.8)
PROT SERPL-MCNC: 4.8 G/DL (ref 6.8–8.8)
PROT SERPL-MCNC: 5.1 G/DL (ref 6.8–8.8)
PROT SERPL-MCNC: 6.2 G/DL (ref 6.8–8.8)
PROT SERPL-MCNC: 6.6 G/DL (ref 6.8–8.8)
RADIOLOGIST FLAGS: ABNORMAL
RBC # BLD AUTO: 2.73 10E6/UL (ref 4.4–5.9)
RBC # BLD AUTO: 3.29 10E6/UL (ref 4.4–5.9)
RBC # BLD AUTO: 3.63 10E6/UL (ref 4.4–5.9)
RBC # BLD AUTO: 4.1 10E6/UL (ref 4.4–5.9)
RBC # BLD AUTO: 4.65 10E6/UL (ref 4.4–5.9)
RBC MORPH BLD: NORMAL
RBC URINE: 4 /HPF
RBC URINE: >182 /HPF
SA TARGET DNA: ABNORMAL
SARS-COV-2 RNA RESP QL NAA+PROBE: NEGATIVE
SODIUM BLD-SCNC: 145 MMOL/L (ref 133–144)
SODIUM BLD-SCNC: 148 MMOL/L (ref 133–144)
SODIUM SERPL-SCNC: 138 MMOL/L (ref 133–144)
SODIUM SERPL-SCNC: 139 MMOL/L (ref 133–144)
SODIUM SERPL-SCNC: 142 MMOL/L (ref 133–144)
SODIUM SERPL-SCNC: 142 MMOL/L (ref 133–144)
SODIUM SERPL-SCNC: 144 MMOL/L (ref 133–144)
SODIUM SERPL-SCNC: 145 MMOL/L (ref 133–144)
SODIUM SERPL-SCNC: 145 MMOL/L (ref 133–144)
SODIUM SERPL-SCNC: 146 MMOL/L (ref 133–144)
SODIUM SERPL-SCNC: 147 MMOL/L (ref 133–144)
SODIUM SERPL-SCNC: 149 MMOL/L (ref 133–144)
SODIUM SERPL-SCNC: 150 MMOL/L (ref 133–144)
SP GR UR STRIP: 1.02 (ref 1–1.03)
SP GR UR STRIP: 1.02 (ref 1–1.03)
SPECIMEN EXPIRATION DATE: NORMAL
SPECIMEN EXPIRATION DATE: NORMAL
SPERM #/AREA URNS HPF: PRESENT /HPF
TROPONIN I SERPL HS-MCNC: 6657 NG/L
TROPONIN I SERPL HS-MCNC: 69 NG/L
TROPONIN I SERPL HS-MCNC: 8874 NG/L
TROPONIN I SERPL HS-MCNC: ABNORMAL NG/L
TROPONIN T BLD-MCNC: 0.07 UG/L
UNIT ABO/RH: NORMAL
UNIT NUMBER: NORMAL
UNIT STATUS: NORMAL
UNIT TYPE ISBT: 5100
UNIT TYPE ISBT: 6200
UROBILINOGEN UR STRIP-MCNC: NORMAL MG/DL
UROBILINOGEN UR STRIP-MCNC: NORMAL MG/DL
VANCOMYCIN SERPL-MCNC: <0.8 MG/L
WBC # BLD AUTO: 1.2 10E3/UL (ref 4–11)
WBC # BLD AUTO: 1.4 10E3/UL (ref 4–11)
WBC # BLD AUTO: 1.6 10E3/UL (ref 4–11)
WBC # BLD AUTO: 10.7 10E3/UL (ref 4–11)
WBC # BLD AUTO: 4.6 10E3/UL (ref 4–11)
WBC URINE: 12 /HPF
WBC URINE: >182 /HPF

## 2021-01-01 PROCEDURE — 82805 BLOOD GASES W/O2 SATURATION: CPT | Performed by: EMERGENCY MEDICINE

## 2021-01-01 PROCEDURE — 70450 CT HEAD/BRAIN W/O DYE: CPT

## 2021-01-01 PROCEDURE — 250N000009 HC RX 250: Performed by: STUDENT IN AN ORGANIZED HEALTH CARE EDUCATION/TRAINING PROGRAM

## 2021-01-01 PROCEDURE — 80320 DRUG SCREEN QUANTALCOHOLS: CPT | Performed by: STUDENT IN AN ORGANIZED HEALTH CARE EDUCATION/TRAINING PROGRAM

## 2021-01-01 PROCEDURE — 94002 VENT MGMT INPAT INIT DAY: CPT

## 2021-01-01 PROCEDURE — 85396 CLOTTING ASSAY WHOLE BLOOD: CPT | Performed by: INTERNAL MEDICINE

## 2021-01-01 PROCEDURE — 250N000011 HC RX IP 250 OP 636

## 2021-01-01 PROCEDURE — 83735 ASSAY OF MAGNESIUM: CPT | Performed by: STUDENT IN AN ORGANIZED HEALTH CARE EDUCATION/TRAINING PROGRAM

## 2021-01-01 PROCEDURE — 74177 CT ABD & PELVIS W/CONTRAST: CPT

## 2021-01-01 PROCEDURE — 250N000011 HC RX IP 250 OP 636: Performed by: INTERNAL MEDICINE

## 2021-01-01 PROCEDURE — 999N000208 ECHOCARDIOGRAM COMPLETE

## 2021-01-01 PROCEDURE — 82805 BLOOD GASES W/O2 SATURATION: CPT | Performed by: STUDENT IN AN ORGANIZED HEALTH CARE EDUCATION/TRAINING PROGRAM

## 2021-01-01 PROCEDURE — 258N000003 HC RX IP 258 OP 636: Performed by: STUDENT IN AN ORGANIZED HEALTH CARE EDUCATION/TRAINING PROGRAM

## 2021-01-01 PROCEDURE — 999N000157 HC STATISTIC RCP TIME EA 10 MIN

## 2021-01-01 PROCEDURE — 85027 COMPLETE CBC AUTOMATED: CPT

## 2021-01-01 PROCEDURE — 85610 PROTHROMBIN TIME: CPT | Performed by: STUDENT IN AN ORGANIZED HEALTH CARE EDUCATION/TRAINING PROGRAM

## 2021-01-01 PROCEDURE — 71045 X-RAY EXAM CHEST 1 VIEW: CPT | Mod: 26 | Performed by: STUDENT IN AN ORGANIZED HEALTH CARE EDUCATION/TRAINING PROGRAM

## 2021-01-01 PROCEDURE — 85610 PROTHROMBIN TIME: CPT | Performed by: INTERNAL MEDICINE

## 2021-01-01 PROCEDURE — C8929 TTE W OR WO FOL WCON,DOPPLER: HCPCS

## 2021-01-01 PROCEDURE — 250N000009 HC RX 250: Performed by: INTERNAL MEDICINE

## 2021-01-01 PROCEDURE — 80347 BENZODIAZEPINES 13 OR MORE: CPT | Performed by: STUDENT IN AN ORGANIZED HEALTH CARE EDUCATION/TRAINING PROGRAM

## 2021-01-01 PROCEDURE — 72125 CT NECK SPINE W/O DYE: CPT

## 2021-01-01 PROCEDURE — 87641 MR-STAPH DNA AMP PROBE: CPT | Performed by: STUDENT IN AN ORGANIZED HEALTH CARE EDUCATION/TRAINING PROGRAM

## 2021-01-01 PROCEDURE — 93456 R HRT CORONARY ARTERY ANGIO: CPT | Performed by: INTERNAL MEDICINE

## 2021-01-01 PROCEDURE — 36415 COLL VENOUS BLD VENIPUNCTURE: CPT | Performed by: EMERGENCY MEDICINE

## 2021-01-01 PROCEDURE — 86316 IMMUNOASSAY TUMOR OTHER: CPT | Performed by: STUDENT IN AN ORGANIZED HEALTH CARE EDUCATION/TRAINING PROGRAM

## 2021-01-01 PROCEDURE — 96365 THER/PROPH/DIAG IV INF INIT: CPT | Mod: 59

## 2021-01-01 PROCEDURE — 81001 URINALYSIS AUTO W/SCOPE: CPT | Performed by: STUDENT IN AN ORGANIZED HEALTH CARE EDUCATION/TRAINING PROGRAM

## 2021-01-01 PROCEDURE — 71045 X-RAY EXAM CHEST 1 VIEW: CPT

## 2021-01-01 PROCEDURE — 84145 PROCALCITONIN (PCT): CPT | Performed by: STUDENT IN AN ORGANIZED HEALTH CARE EDUCATION/TRAINING PROGRAM

## 2021-01-01 PROCEDURE — 85384 FIBRINOGEN ACTIVITY: CPT | Performed by: INTERNAL MEDICINE

## 2021-01-01 PROCEDURE — 95718 EEG PHYS/QHP 2-12 HR W/VEEG: CPT | Performed by: PSYCHIATRY & NEUROLOGY

## 2021-01-01 PROCEDURE — 255N000002 HC RX 255 OP 636: Performed by: INTERNAL MEDICINE

## 2021-01-01 PROCEDURE — 36556 INSERT NON-TUNNEL CV CATH: CPT

## 2021-01-01 PROCEDURE — 258N000001 HC RX 258: Performed by: STUDENT IN AN ORGANIZED HEALTH CARE EDUCATION/TRAINING PROGRAM

## 2021-01-01 PROCEDURE — 250N000011 HC RX IP 250 OP 636: Performed by: EMERGENCY MEDICINE

## 2021-01-01 PROCEDURE — P9037 PLATE PHERES LEUKOREDU IRRAD: HCPCS | Performed by: STUDENT IN AN ORGANIZED HEALTH CARE EDUCATION/TRAINING PROGRAM

## 2021-01-01 PROCEDURE — 250N000009 HC RX 250: Performed by: EMERGENCY MEDICINE

## 2021-01-01 PROCEDURE — 85730 THROMBOPLASTIN TIME PARTIAL: CPT | Performed by: EMERGENCY MEDICINE

## 2021-01-01 PROCEDURE — P9059 PLASMA, FRZ BETWEEN 8-24HOUR: HCPCS | Performed by: STUDENT IN AN ORGANIZED HEALTH CARE EDUCATION/TRAINING PROGRAM

## 2021-01-01 PROCEDURE — 86901 BLOOD TYPING SEROLOGIC RH(D): CPT | Performed by: STUDENT IN AN ORGANIZED HEALTH CARE EDUCATION/TRAINING PROGRAM

## 2021-01-01 PROCEDURE — 84484 ASSAY OF TROPONIN QUANT: CPT | Performed by: EMERGENCY MEDICINE

## 2021-01-01 PROCEDURE — 272N000001 HC OR GENERAL SUPPLY STERILE: Performed by: INTERNAL MEDICINE

## 2021-01-01 PROCEDURE — 999N000045 HC STATISTIC DAILY SWAN MONITORING

## 2021-01-01 PROCEDURE — 85027 COMPLETE CBC AUTOMATED: CPT | Performed by: STUDENT IN AN ORGANIZED HEALTH CARE EDUCATION/TRAINING PROGRAM

## 2021-01-01 PROCEDURE — 250N000013 HC RX MED GY IP 250 OP 250 PS 637: Performed by: STUDENT IN AN ORGANIZED HEALTH CARE EDUCATION/TRAINING PROGRAM

## 2021-01-01 PROCEDURE — 82803 BLOOD GASES ANY COMBINATION: CPT | Performed by: STUDENT IN AN ORGANIZED HEALTH CARE EDUCATION/TRAINING PROGRAM

## 2021-01-01 PROCEDURE — 85652 RBC SED RATE AUTOMATED: CPT | Performed by: STUDENT IN AN ORGANIZED HEALTH CARE EDUCATION/TRAINING PROGRAM

## 2021-01-01 PROCEDURE — 250N000013 HC RX MED GY IP 250 OP 250 PS 637: Performed by: EMERGENCY MEDICINE

## 2021-01-01 PROCEDURE — 82248 BILIRUBIN DIRECT: CPT | Performed by: EMERGENCY MEDICINE

## 2021-01-01 PROCEDURE — G0278 ILIAC ART ANGIO,CARDIAC CATH: HCPCS | Performed by: INTERNAL MEDICINE

## 2021-01-01 PROCEDURE — 82040 ASSAY OF SERUM ALBUMIN: CPT | Performed by: INTERNAL MEDICINE

## 2021-01-01 PROCEDURE — 999N000077 HC STATISTIC INSERT IABP

## 2021-01-01 PROCEDURE — 250N000011 HC RX IP 250 OP 636: Performed by: STUDENT IN AN ORGANIZED HEALTH CARE EDUCATION/TRAINING PROGRAM

## 2021-01-01 PROCEDURE — 83615 LACTATE (LD) (LDH) ENZYME: CPT | Performed by: STUDENT IN AN ORGANIZED HEALTH CARE EDUCATION/TRAINING PROGRAM

## 2021-01-01 PROCEDURE — 51702 INSERT TEMP BLADDER CATH: CPT

## 2021-01-01 PROCEDURE — 80354 DRUG SCREENING FENTANYL: CPT | Performed by: STUDENT IN AN ORGANIZED HEALTH CARE EDUCATION/TRAINING PROGRAM

## 2021-01-01 PROCEDURE — 82947 ASSAY GLUCOSE BLOOD QUANT: CPT | Performed by: STUDENT IN AN ORGANIZED HEALTH CARE EDUCATION/TRAINING PROGRAM

## 2021-01-01 PROCEDURE — 999N000155 HC STATISTIC RAPCV CVP MONITORING

## 2021-01-01 PROCEDURE — 85610 PROTHROMBIN TIME: CPT | Performed by: EMERGENCY MEDICINE

## 2021-01-01 PROCEDURE — 85018 HEMOGLOBIN: CPT | Performed by: STUDENT IN AN ORGANIZED HEALTH CARE EDUCATION/TRAINING PROGRAM

## 2021-01-01 PROCEDURE — 82533 TOTAL CORTISOL: CPT | Performed by: STUDENT IN AN ORGANIZED HEALTH CARE EDUCATION/TRAINING PROGRAM

## 2021-01-01 PROCEDURE — 87635 SARS-COV-2 COVID-19 AMP PRB: CPT | Performed by: EMERGENCY MEDICINE

## 2021-01-01 PROCEDURE — 76705 ECHO EXAM OF ABDOMEN: CPT

## 2021-01-01 PROCEDURE — 82310 ASSAY OF CALCIUM: CPT | Performed by: STUDENT IN AN ORGANIZED HEALTH CARE EDUCATION/TRAINING PROGRAM

## 2021-01-01 PROCEDURE — 999N000065 XR CHEST PORT 1 VIEW

## 2021-01-01 PROCEDURE — 82330 ASSAY OF CALCIUM: CPT | Performed by: STUDENT IN AN ORGANIZED HEALTH CARE EDUCATION/TRAINING PROGRAM

## 2021-01-01 PROCEDURE — 85730 THROMBOPLASTIN TIME PARTIAL: CPT | Performed by: STUDENT IN AN ORGANIZED HEALTH CARE EDUCATION/TRAINING PROGRAM

## 2021-01-01 PROCEDURE — 80053 COMPREHEN METABOLIC PANEL: CPT | Performed by: EMERGENCY MEDICINE

## 2021-01-01 PROCEDURE — 86140 C-REACTIVE PROTEIN: CPT | Performed by: STUDENT IN AN ORGANIZED HEALTH CARE EDUCATION/TRAINING PROGRAM

## 2021-01-01 PROCEDURE — C1894 INTRO/SHEATH, NON-LASER: HCPCS | Performed by: INTERNAL MEDICINE

## 2021-01-01 PROCEDURE — 76705 ECHO EXAM OF ABDOMEN: CPT | Mod: 26 | Performed by: STUDENT IN AN ORGANIZED HEALTH CARE EDUCATION/TRAINING PROGRAM

## 2021-01-01 PROCEDURE — 83735 ASSAY OF MAGNESIUM: CPT | Performed by: INTERNAL MEDICINE

## 2021-01-01 PROCEDURE — C9113 INJ PANTOPRAZOLE SODIUM, VIA: HCPCS | Performed by: STUDENT IN AN ORGANIZED HEALTH CARE EDUCATION/TRAINING PROGRAM

## 2021-01-01 PROCEDURE — 258N000003 HC RX IP 258 OP 636: Performed by: EMERGENCY MEDICINE

## 2021-01-01 PROCEDURE — 4A023N6 MEASUREMENT OF CARDIAC SAMPLING AND PRESSURE, RIGHT HEART, PERCUTANEOUS APPROACH: ICD-10-PCS | Performed by: INTERNAL MEDICINE

## 2021-01-01 PROCEDURE — 999N000158 HC STATISTIC RCP TIME ED VENT EA 10 MIN

## 2021-01-01 PROCEDURE — 86901 BLOOD TYPING SEROLOGIC RH(D): CPT | Performed by: EMERGENCY MEDICINE

## 2021-01-01 PROCEDURE — 82810 BLOOD GASES O2 SAT ONLY: CPT

## 2021-01-01 PROCEDURE — 83605 ASSAY OF LACTIC ACID: CPT | Performed by: STUDENT IN AN ORGANIZED HEALTH CARE EDUCATION/TRAINING PROGRAM

## 2021-01-01 PROCEDURE — 93880 EXTRACRANIAL BILAT STUDY: CPT | Mod: 26 | Performed by: RADIOLOGY

## 2021-01-01 PROCEDURE — 84132 ASSAY OF SERUM POTASSIUM: CPT | Performed by: STUDENT IN AN ORGANIZED HEALTH CARE EDUCATION/TRAINING PROGRAM

## 2021-01-01 PROCEDURE — 33967 INSERT I-AORT PERCUT DEVICE: CPT | Performed by: INTERNAL MEDICINE

## 2021-01-01 PROCEDURE — B2111ZZ FLUOROSCOPY OF MULTIPLE CORONARY ARTERIES USING LOW OSMOLAR CONTRAST: ICD-10-PCS | Performed by: INTERNAL MEDICINE

## 2021-01-01 PROCEDURE — 82805 BLOOD GASES W/O2 SATURATION: CPT | Performed by: INTERNAL MEDICINE

## 2021-01-01 PROCEDURE — 99292 CRITICAL CARE ADDL 30 MIN: CPT

## 2021-01-01 PROCEDURE — C9803 HOPD COVID-19 SPEC COLLECT: HCPCS

## 2021-01-01 PROCEDURE — 99292 CRITICAL CARE ADDL 30 MIN: CPT | Mod: 25 | Performed by: INTERNAL MEDICINE

## 2021-01-01 PROCEDURE — 999N000015 HC STATISTIC ARTERIAL MONITORING DAILY

## 2021-01-01 PROCEDURE — P9012 CRYOPRECIPITATE EACH UNIT: HCPCS | Performed by: INTERNAL MEDICINE

## 2021-01-01 PROCEDURE — 84484 ASSAY OF TROPONIN QUANT: CPT | Performed by: STUDENT IN AN ORGANIZED HEALTH CARE EDUCATION/TRAINING PROGRAM

## 2021-01-01 PROCEDURE — 87086 URINE CULTURE/COLONY COUNT: CPT | Performed by: STUDENT IN AN ORGANIZED HEALTH CARE EDUCATION/TRAINING PROGRAM

## 2021-01-01 PROCEDURE — 99291 CRITICAL CARE FIRST HOUR: CPT | Mod: 25

## 2021-01-01 PROCEDURE — 83605 ASSAY OF LACTIC ACID: CPT | Performed by: EMERGENCY MEDICINE

## 2021-01-01 PROCEDURE — 93005 ELECTROCARDIOGRAM TRACING: CPT | Mod: 76,59

## 2021-01-01 PROCEDURE — 84132 ASSAY OF SERUM POTASSIUM: CPT

## 2021-01-01 PROCEDURE — 83880 ASSAY OF NATRIURETIC PEPTIDE: CPT | Mod: GZ | Performed by: EMERGENCY MEDICINE

## 2021-01-01 PROCEDURE — 80202 ASSAY OF VANCOMYCIN: CPT | Performed by: INTERNAL MEDICINE

## 2021-01-01 PROCEDURE — 95711 VEEG 2-12 HR UNMONITORED: CPT

## 2021-01-01 PROCEDURE — 36620 INSERTION CATHETER ARTERY: CPT

## 2021-01-01 PROCEDURE — 84484 ASSAY OF TROPONIN QUANT: CPT | Mod: 91 | Performed by: EMERGENCY MEDICINE

## 2021-01-01 PROCEDURE — P9041 ALBUMIN (HUMAN),5%, 50ML: HCPCS

## 2021-01-01 PROCEDURE — 5A02210 ASSISTANCE WITH CARDIAC OUTPUT USING BALLOON PUMP, CONTINUOUS: ICD-10-PCS | Performed by: INTERNAL MEDICINE

## 2021-01-01 PROCEDURE — 82803 BLOOD GASES ANY COMBINATION: CPT

## 2021-01-01 PROCEDURE — 96366 THER/PROPH/DIAG IV INF ADDON: CPT

## 2021-01-01 PROCEDURE — 3E043XZ INTRODUCTION OF VASOPRESSOR INTO CENTRAL VEIN, PERCUTANEOUS APPROACH: ICD-10-PCS | Performed by: SURGERY

## 2021-01-01 PROCEDURE — 6A4Z0ZZ HYPOTHERMIA, SINGLE: ICD-10-PCS | Performed by: PSYCHIATRY & NEUROLOGY

## 2021-01-01 PROCEDURE — P9012 CRYOPRECIPITATE EACH UNIT: HCPCS | Performed by: STUDENT IN AN ORGANIZED HEALTH CARE EDUCATION/TRAINING PROGRAM

## 2021-01-01 PROCEDURE — 99222 1ST HOSP IP/OBS MODERATE 55: CPT | Mod: GC | Performed by: PSYCHIATRY & NEUROLOGY

## 2021-01-01 PROCEDURE — 93306 TTE W/DOPPLER COMPLETE: CPT | Mod: 26 | Performed by: INTERNAL MEDICINE

## 2021-01-01 PROCEDURE — 250N000009 HC RX 250

## 2021-01-01 PROCEDURE — 80307 DRUG TEST PRSMV CHEM ANLYZR: CPT | Performed by: STUDENT IN AN ORGANIZED HEALTH CARE EDUCATION/TRAINING PROGRAM

## 2021-01-01 PROCEDURE — 93005 ELECTROCARDIOGRAM TRACING: CPT

## 2021-01-01 PROCEDURE — 93010 ELECTROCARDIOGRAM REPORT: CPT | Mod: 76 | Performed by: INTERNAL MEDICINE

## 2021-01-01 PROCEDURE — 84155 ASSAY OF PROTEIN SERUM: CPT | Performed by: STUDENT IN AN ORGANIZED HEALTH CARE EDUCATION/TRAINING PROGRAM

## 2021-01-01 PROCEDURE — 999N000075 HC STATISTIC IABP MONITORING

## 2021-01-01 PROCEDURE — 258N000001 HC RX 258

## 2021-01-01 PROCEDURE — 85396 CLOTTING ASSAY WHOLE BLOOD: CPT | Performed by: STUDENT IN AN ORGANIZED HEALTH CARE EDUCATION/TRAINING PROGRAM

## 2021-01-01 PROCEDURE — 82248 BILIRUBIN DIRECT: CPT | Performed by: INTERNAL MEDICINE

## 2021-01-01 PROCEDURE — 99153 MOD SED SAME PHYS/QHP EA: CPT

## 2021-01-01 PROCEDURE — 86850 RBC ANTIBODY SCREEN: CPT | Performed by: STUDENT IN AN ORGANIZED HEALTH CARE EDUCATION/TRAINING PROGRAM

## 2021-01-01 PROCEDURE — P9041 ALBUMIN (HUMAN),5%, 50ML: HCPCS | Performed by: INTERNAL MEDICINE

## 2021-01-01 PROCEDURE — 71275 CT ANGIOGRAPHY CHEST: CPT | Mod: MG

## 2021-01-01 PROCEDURE — 94003 VENT MGMT INPAT SUBQ DAY: CPT

## 2021-01-01 PROCEDURE — 87040 BLOOD CULTURE FOR BACTERIA: CPT | Performed by: EMERGENCY MEDICINE

## 2021-01-01 PROCEDURE — 84484 ASSAY OF TROPONIN QUANT: CPT

## 2021-01-01 PROCEDURE — 85025 COMPLETE CBC W/AUTO DIFF WBC: CPT | Performed by: STUDENT IN AN ORGANIZED HEALTH CARE EDUCATION/TRAINING PROGRAM

## 2021-01-01 PROCEDURE — 93926 LOWER EXTREMITY STUDY: CPT | Mod: 26 | Performed by: RADIOLOGY

## 2021-01-01 PROCEDURE — 93926 LOWER EXTREMITY STUDY: CPT | Mod: RT

## 2021-01-01 PROCEDURE — 85025 COMPLETE CBC W/AUTO DIFF WBC: CPT | Performed by: EMERGENCY MEDICINE

## 2021-01-01 PROCEDURE — 93880 EXTRACRANIAL BILAT STUDY: CPT

## 2021-01-01 PROCEDURE — 31500 INSERT EMERGENCY AIRWAY: CPT

## 2021-01-01 PROCEDURE — 93308 TTE F-UP OR LMTD: CPT

## 2021-01-01 PROCEDURE — 99152 MOD SED SAME PHYS/QHP 5/>YRS: CPT

## 2021-01-01 PROCEDURE — 84100 ASSAY OF PHOSPHORUS: CPT | Performed by: STUDENT IN AN ORGANIZED HEALTH CARE EDUCATION/TRAINING PROGRAM

## 2021-01-01 PROCEDURE — 99291 CRITICAL CARE FIRST HOUR: CPT | Mod: 25 | Performed by: INTERNAL MEDICINE

## 2021-01-01 PROCEDURE — 120N000005 HC R&B MS OVERFLOW UMMC

## 2021-01-01 PROCEDURE — 99207 PR CONSULT E&M CHANGED TO INITIAL LEVEL: CPT | Performed by: PSYCHIATRY & NEUROLOGY

## 2021-01-01 PROCEDURE — 81001 URINALYSIS AUTO W/SCOPE: CPT | Performed by: EMERGENCY MEDICINE

## 2021-01-01 PROCEDURE — 5A1945Z RESPIRATORY VENTILATION, 24-96 CONSECUTIVE HOURS: ICD-10-PCS | Performed by: PSYCHIATRY & NEUROLOGY

## 2021-01-01 PROCEDURE — 272N000017 HC KIT MONITOR CVP

## 2021-01-01 PROCEDURE — 84132 ASSAY OF SERUM POTASSIUM: CPT | Performed by: INTERNAL MEDICINE

## 2021-01-01 PROCEDURE — 999N000185 HC STATISTIC TRANSPORT TIME EA 15 MIN

## 2021-01-01 PROCEDURE — 85384 FIBRINOGEN ACTIVITY: CPT | Performed by: STUDENT IN AN ORGANIZED HEALTH CARE EDUCATION/TRAINING PROGRAM

## 2021-01-01 PROCEDURE — 93567 NJX CAR CTH SPRVLV AORTGRPHY: CPT | Performed by: INTERNAL MEDICINE

## 2021-01-01 PROCEDURE — 96365 THER/PROPH/DIAG IV INF INIT: CPT | Mod: 25,59

## 2021-01-01 PROCEDURE — 258N000003 HC RX IP 258 OP 636: Performed by: INTERNAL MEDICINE

## 2021-01-01 PROCEDURE — B41D1ZZ FLUOROSCOPY OF AORTA AND BILATERAL LOWER EXTREMITY ARTERIES USING LOW OSMOLAR CONTRAST: ICD-10-PCS | Performed by: INTERNAL MEDICINE

## 2021-01-01 PROCEDURE — 99223 1ST HOSP IP/OBS HIGH 75: CPT | Mod: GC | Performed by: INTERNAL MEDICINE

## 2021-01-01 PROCEDURE — 82330 ASSAY OF CALCIUM: CPT | Performed by: INTERNAL MEDICINE

## 2021-01-01 RX ORDER — PIPERACILLIN SODIUM, TAZOBACTAM SODIUM 3; .375 G/15ML; G/15ML
3.38 INJECTION, POWDER, LYOPHILIZED, FOR SOLUTION INTRAVENOUS EVERY 6 HOURS
Status: CANCELLED | OUTPATIENT
Start: 2021-01-01 | End: 2021-12-16

## 2021-01-01 RX ORDER — ATROPINE SULFATE 0.1 MG/ML
1 INJECTION INTRAVENOUS ONCE
Status: COMPLETED | OUTPATIENT
Start: 2021-01-01 | End: 2021-01-01

## 2021-01-01 RX ORDER — DEXTROSE MONOHYDRATE 25 G/50ML
INJECTION, SOLUTION INTRAVENOUS
Status: COMPLETED
Start: 2021-01-01 | End: 2021-01-01

## 2021-01-01 RX ORDER — ALBUMIN, HUMAN INJ 5% 5 %
SOLUTION INTRAVENOUS
Status: COMPLETED
Start: 2021-01-01 | End: 2021-01-01

## 2021-01-01 RX ORDER — CALCIUM CHLORIDE 100 MG/ML
1 INJECTION INTRAVENOUS; INTRAVENTRICULAR ONCE
Status: COMPLETED | OUTPATIENT
Start: 2021-01-01 | End: 2021-01-01

## 2021-01-01 RX ORDER — ATROPINE SULFATE 0.1 MG/ML
INJECTION INTRAVENOUS
Status: COMPLETED
Start: 2021-01-01 | End: 2021-01-01

## 2021-01-01 RX ORDER — IOPAMIDOL 755 MG/ML
500 INJECTION, SOLUTION INTRAVASCULAR ONCE
Status: COMPLETED | OUTPATIENT
Start: 2021-01-01 | End: 2021-01-01

## 2021-01-01 RX ORDER — MORPHINE SULFATE 2 MG/ML
1-2 INJECTION, SOLUTION INTRAMUSCULAR; INTRAVENOUS
Status: DISCONTINUED | OUTPATIENT
Start: 2021-01-01 | End: 2021-01-01 | Stop reason: HOSPADM

## 2021-01-01 RX ORDER — NALOXONE HYDROCHLORIDE 0.4 MG/ML
0.2 INJECTION, SOLUTION INTRAMUSCULAR; INTRAVENOUS; SUBCUTANEOUS
Status: DISCONTINUED | OUTPATIENT
Start: 2021-01-01 | End: 2021-01-01 | Stop reason: HOSPADM

## 2021-01-01 RX ORDER — CALCIUM GLUCONATE 20 MG/ML
2 INJECTION, SOLUTION INTRAVENOUS EVERY 8 HOURS PRN
Status: DISCONTINUED | OUTPATIENT
Start: 2021-01-01 | End: 2021-01-01

## 2021-01-01 RX ORDER — MIDAZOLAM HCL IN 0.9 % NACL/PF 1 MG/ML
1-8 PLASTIC BAG, INJECTION (ML) INTRAVENOUS CONTINUOUS
Status: CANCELLED | OUTPATIENT
Start: 2021-01-01

## 2021-01-01 RX ORDER — MIDAZOLAM HCL IN 0.9 % NACL/PF 1 MG/ML
1-8 PLASTIC BAG, INJECTION (ML) INTRAVENOUS CONTINUOUS
Status: DISCONTINUED | OUTPATIENT
Start: 2021-01-01 | End: 2021-01-01 | Stop reason: HOSPADM

## 2021-01-01 RX ORDER — IOPAMIDOL 755 MG/ML
INJECTION, SOLUTION INTRAVASCULAR
Status: DISCONTINUED | OUTPATIENT
Start: 2021-01-01 | End: 2021-01-01 | Stop reason: HOSPADM

## 2021-01-01 RX ORDER — CHLORHEXIDINE GLUCONATE ORAL RINSE 1.2 MG/ML
15 SOLUTION DENTAL 2 TIMES DAILY
Status: DISCONTINUED | OUTPATIENT
Start: 2021-01-01 | End: 2021-01-01 | Stop reason: HOSPADM

## 2021-01-01 RX ORDER — POTASSIUM CHLORIDE 29.8 MG/ML
20 INJECTION INTRAVENOUS
Status: DISCONTINUED | OUTPATIENT
Start: 2021-01-01 | End: 2021-01-01

## 2021-01-01 RX ORDER — CALCIUM GLUCONATE 20 MG/ML
1 INJECTION, SOLUTION INTRAVENOUS ONCE
Status: COMPLETED | OUTPATIENT
Start: 2021-01-01 | End: 2021-01-01

## 2021-01-01 RX ORDER — NALOXONE HYDROCHLORIDE 0.4 MG/ML
0.4 INJECTION, SOLUTION INTRAMUSCULAR; INTRAVENOUS; SUBCUTANEOUS
Status: DISCONTINUED | OUTPATIENT
Start: 2021-01-01 | End: 2021-01-01 | Stop reason: HOSPADM

## 2021-01-01 RX ORDER — NOREPINEPHRINE BITARTRATE 0.06 MG/ML
.01-.6 INJECTION, SOLUTION INTRAVENOUS CONTINUOUS PRN
Status: DISCONTINUED | OUTPATIENT
Start: 2021-01-01 | End: 2021-01-01

## 2021-01-01 RX ORDER — EPINEPHRINE IN 0.9 % SOD CHLOR 5 MG/250ML
.03-.3 PLASTIC BAG, INJECTION (ML) INTRAVENOUS CONTINUOUS
Status: DISCONTINUED | OUTPATIENT
Start: 2021-01-01 | End: 2021-01-01 | Stop reason: HOSPADM

## 2021-01-01 RX ORDER — ALBUMIN, HUMAN INJ 5% 5 %
25 SOLUTION INTRAVENOUS ONCE
Status: COMPLETED | OUTPATIENT
Start: 2021-01-01 | End: 2021-01-01

## 2021-01-01 RX ORDER — PROPOFOL 10 MG/ML
5-75 INJECTION, EMULSION INTRAVENOUS CONTINUOUS
Status: DISCONTINUED | OUTPATIENT
Start: 2021-01-01 | End: 2021-01-01 | Stop reason: HOSPADM

## 2021-01-01 RX ORDER — PROPOFOL 10 MG/ML
5-75 INJECTION, EMULSION INTRAVENOUS CONTINUOUS
Status: CANCELLED | OUTPATIENT
Start: 2021-01-01

## 2021-01-01 RX ORDER — CARBOXYMETHYLCELLULOSE SODIUM 5 MG/ML
1-2 SOLUTION/ DROPS OPHTHALMIC
Status: DISCONTINUED | OUTPATIENT
Start: 2021-01-01 | End: 2021-01-01 | Stop reason: HOSPADM

## 2021-01-01 RX ORDER — MEPERIDINE HYDROCHLORIDE 25 MG/ML
25-50 INJECTION INTRAMUSCULAR; INTRAVENOUS; SUBCUTANEOUS
Status: CANCELLED | OUTPATIENT
Start: 2021-01-01 | End: 2021-01-01

## 2021-01-01 RX ORDER — CALCIUM CHLORIDE, MAGNESIUM CHLORIDE, SODIUM CHLORIDE, SODIUM BICARBONATE, POTASSIUM CHLORIDE AND SODIUM PHOSPHATE DIBASIC DIHYDRATE 3.68; 3.05; 6.34; 3.09; .314; .187 G/L; G/L; G/L; G/L; G/L; G/L
12.5 INJECTION INTRAVENOUS CONTINUOUS
Status: DISCONTINUED | OUTPATIENT
Start: 2021-01-01 | End: 2021-01-01

## 2021-01-01 RX ORDER — DEXTROSE MONOHYDRATE 25 G/50ML
25-50 INJECTION, SOLUTION INTRAVENOUS
Status: DISCONTINUED | OUTPATIENT
Start: 2021-01-01 | End: 2021-01-01 | Stop reason: HOSPADM

## 2021-01-01 RX ORDER — PHENYLEPHRINE HCL IN 0.9% NACL 50MG/250ML
.5-6 PLASTIC BAG, INJECTION (ML) INTRAVENOUS CONTINUOUS
Status: CANCELLED | OUTPATIENT
Start: 2021-01-01

## 2021-01-01 RX ORDER — MAGNESIUM SULFATE HEPTAHYDRATE 40 MG/ML
4 INJECTION, SOLUTION INTRAVENOUS EVERY 4 HOURS PRN
Status: DISCONTINUED | OUTPATIENT
Start: 2021-01-01 | End: 2021-01-01 | Stop reason: HOSPADM

## 2021-01-01 RX ORDER — ROCURONIUM BROMIDE 50 MG/5 ML
100 SYRINGE (ML) INTRAVENOUS ONCE
Status: COMPLETED | OUTPATIENT
Start: 2021-01-01 | End: 2021-01-01

## 2021-01-01 RX ORDER — NALOXONE HYDROCHLORIDE 0.4 MG/ML
0.1 INJECTION, SOLUTION INTRAMUSCULAR; INTRAVENOUS; SUBCUTANEOUS
Status: DISCONTINUED | OUTPATIENT
Start: 2021-01-01 | End: 2021-01-01 | Stop reason: HOSPADM

## 2021-01-01 RX ORDER — NICOTINE POLACRILEX 4 MG
15-30 LOZENGE BUCCAL
Status: DISCONTINUED | OUTPATIENT
Start: 2021-01-01 | End: 2021-01-01 | Stop reason: HOSPADM

## 2021-01-01 RX ORDER — MAGNESIUM SULFATE HEPTAHYDRATE 40 MG/ML
4 INJECTION, SOLUTION INTRAVENOUS EVERY 4 HOURS PRN
Status: CANCELLED | OUTPATIENT
Start: 2021-01-01

## 2021-01-01 RX ORDER — MAGNESIUM SULFATE HEPTAHYDRATE 40 MG/ML
2 INJECTION, SOLUTION INTRAVENOUS DAILY PRN
Status: DISCONTINUED | OUTPATIENT
Start: 2021-01-01 | End: 2021-01-01

## 2021-01-01 RX ORDER — POTASSIUM CHLORIDE 29.8 MG/ML
20 INJECTION INTRAVENOUS
Status: DISCONTINUED | OUTPATIENT
Start: 2021-01-01 | End: 2021-01-01 | Stop reason: HOSPADM

## 2021-01-01 RX ORDER — ETOMIDATE 2 MG/ML
20 INJECTION INTRAVENOUS ONCE
Status: COMPLETED | OUTPATIENT
Start: 2021-01-01 | End: 2021-01-01

## 2021-01-01 RX ORDER — NOREPINEPHRINE BITARTRATE 0.06 MG/ML
.01-.6 INJECTION, SOLUTION INTRAVENOUS CONTINUOUS PRN
Status: DISCONTINUED | OUTPATIENT
Start: 2021-01-01 | End: 2021-01-01 | Stop reason: HOSPADM

## 2021-01-01 RX ORDER — PHENYLEPHRINE HCL IN 0.9% NACL 50MG/250ML
.1-6 PLASTIC BAG, INJECTION (ML) INTRAVENOUS CONTINUOUS
Status: DISCONTINUED | OUTPATIENT
Start: 2021-01-01 | End: 2021-01-01 | Stop reason: HOSPADM

## 2021-01-01 RX ORDER — MAGNESIUM SULFATE HEPTAHYDRATE 40 MG/ML
2 INJECTION, SOLUTION INTRAVENOUS DAILY PRN
Status: DISCONTINUED | OUTPATIENT
Start: 2021-01-01 | End: 2021-01-01 | Stop reason: HOSPADM

## 2021-01-01 RX ORDER — NOREPINEPHRINE BITARTRATE 0.06 MG/ML
.01-.6 INJECTION, SOLUTION INTRAVENOUS CONTINUOUS PRN
Status: CANCELLED | OUTPATIENT
Start: 2021-01-01

## 2021-01-01 RX ORDER — MEPERIDINE HYDROCHLORIDE 25 MG/ML
25-50 INJECTION INTRAMUSCULAR; INTRAVENOUS; SUBCUTANEOUS
Status: DISCONTINUED | OUTPATIENT
Start: 2021-01-01 | End: 2021-01-01 | Stop reason: HOSPADM

## 2021-01-01 RX ORDER — MAGNESIUM SULFATE HEPTAHYDRATE 40 MG/ML
4 INJECTION, SOLUTION INTRAVENOUS EVERY 4 HOURS PRN
Status: DISCONTINUED | OUTPATIENT
Start: 2021-01-01 | End: 2021-01-01

## 2021-01-01 RX ORDER — PIPERACILLIN SODIUM, TAZOBACTAM SODIUM 3; .375 G/15ML; G/15ML
3.38 INJECTION, POWDER, LYOPHILIZED, FOR SOLUTION INTRAVENOUS EVERY 6 HOURS
Status: DISCONTINUED | OUTPATIENT
Start: 2021-01-01 | End: 2021-01-01 | Stop reason: HOSPADM

## 2021-01-01 RX ORDER — POTASSIUM CHLORIDE 29.8 MG/ML
20 INJECTION INTRAVENOUS
Status: CANCELLED | OUTPATIENT
Start: 2021-01-01

## 2021-01-01 RX ORDER — LIDOCAINE 40 MG/G
CREAM TOPICAL
Status: CANCELLED | OUTPATIENT
Start: 2021-01-01

## 2021-01-01 RX ORDER — CALCIUM CHLORIDE, MAGNESIUM CHLORIDE, DEXTROSE MONOHYDRATE, LACTIC ACID, SODIUM CHLORIDE, SODIUM BICARBONATE AND POTASSIUM CHLORIDE 5.15; 2.03; 22; 5.4; 6.46; 3.09; .157 G/L; G/L; G/L; G/L; G/L; G/L; G/L
15 INJECTION INTRAVENOUS CONTINUOUS
Status: DISCONTINUED | OUTPATIENT
Start: 2021-01-01 | End: 2021-01-01

## 2021-01-01 RX ORDER — CALCIUM CHLORIDE, MAGNESIUM CHLORIDE, SODIUM CHLORIDE, SODIUM BICARBONATE, POTASSIUM CHLORIDE AND SODIUM PHOSPHATE DIBASIC DIHYDRATE 3.68; 3.05; 6.34; 3.09; .314; .187 G/L; G/L; G/L; G/L; G/L; G/L
INJECTION INTRAVENOUS CONTINUOUS
Status: DISCONTINUED | OUTPATIENT
Start: 2021-01-01 | End: 2021-01-01

## 2021-01-01 RX ORDER — ASPIRIN 325 MG
325 TABLET ORAL ONCE
Status: COMPLETED | OUTPATIENT
Start: 2021-01-01 | End: 2021-01-01

## 2021-01-01 RX ORDER — CALCIUM CHLORIDE, MAGNESIUM CHLORIDE, DEXTROSE MONOHYDRATE, LACTIC ACID, SODIUM CHLORIDE, SODIUM BICARBONATE AND POTASSIUM CHLORIDE 5.15; 2.03; 22; 5.4; 6.46; 3.09; .157 G/L; G/L; G/L; G/L; G/L; G/L; G/L
30 INJECTION INTRAVENOUS CONTINUOUS
Status: DISCONTINUED | OUTPATIENT
Start: 2021-01-01 | End: 2021-01-01

## 2021-01-01 RX ORDER — MEPERIDINE HYDROCHLORIDE 25 MG/ML
25-50 INJECTION INTRAMUSCULAR; INTRAVENOUS; SUBCUTANEOUS
Status: ACTIVE | OUTPATIENT
Start: 2021-01-01 | End: 2021-01-01

## 2021-01-01 RX ORDER — LIDOCAINE 40 MG/G
CREAM TOPICAL
Status: DISCONTINUED | OUTPATIENT
Start: 2021-01-01 | End: 2021-01-01 | Stop reason: HOSPADM

## 2021-01-01 RX ORDER — NOREPINEPHRINE BITARTRATE 0.02 MG/ML
.01-.6 INJECTION, SOLUTION INTRAVENOUS CONTINUOUS
Status: DISCONTINUED | OUTPATIENT
Start: 2021-01-01 | End: 2021-01-01 | Stop reason: HOSPADM

## 2021-01-01 RX ORDER — POTASSIUM CHLORIDE 29.8 MG/ML
20 INJECTION INTRAVENOUS EVERY 8 HOURS PRN
Status: DISCONTINUED | OUTPATIENT
Start: 2021-01-01 | End: 2021-01-01

## 2021-01-01 RX ORDER — MAGNESIUM SULFATE HEPTAHYDRATE 40 MG/ML
2 INJECTION, SOLUTION INTRAVENOUS EVERY 8 HOURS PRN
Status: DISCONTINUED | OUTPATIENT
Start: 2021-01-01 | End: 2021-01-01

## 2021-01-01 RX ORDER — MAGNESIUM SULFATE HEPTAHYDRATE 40 MG/ML
2 INJECTION, SOLUTION INTRAVENOUS DAILY PRN
Status: CANCELLED | OUTPATIENT
Start: 2021-01-01

## 2021-01-01 RX ORDER — ALBUMIN, HUMAN INJ 5% 5 %
12.5 SOLUTION INTRAVENOUS ONCE
Status: COMPLETED | OUTPATIENT
Start: 2021-01-01 | End: 2021-01-01

## 2021-01-01 RX ORDER — ATROPINE SULFATE 10 MG/ML
2 SOLUTION/ DROPS OPHTHALMIC EVERY 4 HOURS PRN
Status: DISCONTINUED | OUTPATIENT
Start: 2021-01-01 | End: 2021-01-01 | Stop reason: HOSPADM

## 2021-01-01 RX ORDER — PHENYLEPHRINE HCL IN 0.9% NACL 50MG/250ML
.5-6 PLASTIC BAG, INJECTION (ML) INTRAVENOUS CONTINUOUS
Status: DISCONTINUED | OUTPATIENT
Start: 2021-01-01 | End: 2021-01-01 | Stop reason: HOSPADM

## 2021-01-01 RX ADMIN — CALCIUM CHLORIDE, MAGNESIUM CHLORIDE, SODIUM CHLORIDE, SODIUM BICARBONATE, POTASSIUM CHLORIDE AND SODIUM PHOSPHATE DIBASIC DIHYDRATE 12.5 ML/KG/HR: 3.68; 3.05; 6.34; 3.09; .314; .187 INJECTION INTRAVENOUS at 20:31

## 2021-01-01 RX ADMIN — Medication 50 MEQ: at 09:04

## 2021-01-01 RX ADMIN — SODIUM BICARBONATE 25 MEQ: 84 INJECTION INTRAVENOUS at 02:40

## 2021-01-01 RX ADMIN — ALBUMIN HUMAN 12.5 G: 50 SOLUTION INTRAVENOUS at 01:29

## 2021-01-01 RX ADMIN — ROCURONIUM BROMIDE 100 MG: 50 INJECTION, SOLUTION INTRAVENOUS at 22:53

## 2021-01-01 RX ADMIN — PROPOFOL 50 MCG/KG/MIN: 10 INJECTION, EMULSION INTRAVENOUS at 06:49

## 2021-01-01 RX ADMIN — SODIUM BICARBONATE 50 MEQ: 84 INJECTION INTRAVENOUS at 12:09

## 2021-01-01 RX ADMIN — Medication 2 UNITS/HR: at 23:15

## 2021-01-01 RX ADMIN — ALBUMIN HUMAN 25 G: 0.05 INJECTION, SOLUTION INTRAVENOUS at 21:23

## 2021-01-01 RX ADMIN — DEXTROSE MONOHYDRATE 50 ML: 500 INJECTION PARENTERAL at 07:48

## 2021-01-01 RX ADMIN — ALBUMIN (HUMAN) 25 G: 12.5 INJECTION, SOLUTION INTRAVENOUS at 02:39

## 2021-01-01 RX ADMIN — ALBUMIN (HUMAN) 12.5 G: 12.5 INJECTION, SOLUTION INTRAVENOUS at 01:29

## 2021-01-01 RX ADMIN — SODIUM CHLORIDE 80 ML: 9 INJECTION, SOLUTION INTRAVENOUS at 23:32

## 2021-01-01 RX ADMIN — ATROPINE SULFATE 1 MG: 0.1 INJECTION INTRAVENOUS at 00:00

## 2021-01-01 RX ADMIN — SODIUM CHLORIDE, POTASSIUM CHLORIDE, SODIUM LACTATE AND CALCIUM CHLORIDE 2000 ML: 600; 310; 30; 20 INJECTION, SOLUTION INTRAVENOUS at 00:02

## 2021-01-01 RX ADMIN — SODIUM BICARBONATE 50 MEQ: 84 INJECTION INTRAVENOUS at 23:58

## 2021-01-01 RX ADMIN — CALCIUM CHLORIDE, MAGNESIUM CHLORIDE, SODIUM CHLORIDE, SODIUM BICARBONATE, POTASSIUM CHLORIDE AND SODIUM PHOSPHATE DIBASIC DIHYDRATE 12.5 ML/KG/HR: 3.68; 3.05; 6.34; 3.09; .314; .187 INJECTION INTRAVENOUS at 20:30

## 2021-01-01 RX ADMIN — ETOMIDATE 20 MG: 20 INJECTION, SOLUTION INTRAVENOUS at 22:53

## 2021-01-01 RX ADMIN — PROPOFOL 50 MCG/KG/MIN: 10 INJECTION, EMULSION INTRAVENOUS at 10:28

## 2021-01-01 RX ADMIN — SODIUM BICARBONATE: 84 INJECTION, SOLUTION INTRAVENOUS at 14:57

## 2021-01-01 RX ADMIN — AMIODARONE HYDROCHLORIDE 150 MG: 1.5 INJECTION, SOLUTION INTRAVENOUS at 08:32

## 2021-01-01 RX ADMIN — CALCIUM CHLORIDE, MAGNESIUM CHLORIDE, DEXTROSE MONOHYDRATE, LACTIC ACID, SODIUM CHLORIDE, SODIUM BICARBONATE AND POTASSIUM CHLORIDE 15 ML/KG/HR: 5.15; 2.03; 22; 5.4; 6.46; 3.09; .157 INJECTION INTRAVENOUS at 01:12

## 2021-01-01 RX ADMIN — CALCIUM CHLORIDE, MAGNESIUM CHLORIDE, DEXTROSE MONOHYDRATE, LACTIC ACID, SODIUM CHLORIDE, SODIUM BICARBONATE AND POTASSIUM CHLORIDE 20 ML/KG/HR: 5.15; 2.03; 22; 5.4; 6.46; 3.09; .157 INJECTION INTRAVENOUS at 01:12

## 2021-01-01 RX ADMIN — SODIUM BICARBONATE 50 MEQ: 84 INJECTION INTRAVENOUS at 09:04

## 2021-01-01 RX ADMIN — SODIUM BICARBONATE: 84 INJECTION, SOLUTION INTRAVENOUS at 21:33

## 2021-01-01 RX ADMIN — CALCIUM CHLORIDE, MAGNESIUM CHLORIDE, DEXTROSE MONOHYDRATE, LACTIC ACID, SODIUM CHLORIDE, SODIUM BICARBONATE AND POTASSIUM CHLORIDE 20 ML/KG/HR: 5.15; 2.03; 22; 5.4; 6.46; 3.09; .157 INJECTION INTRAVENOUS at 06:32

## 2021-01-01 RX ADMIN — ASPIRIN 325 MG ORAL TABLET 325 MG: 325 PILL ORAL at 01:47

## 2021-01-01 RX ADMIN — PIPERACILLIN AND TAZOBACTAM 3.38 G: 3; .375 INJECTION, POWDER, LYOPHILIZED, FOR SOLUTION INTRAVENOUS at 06:37

## 2021-01-01 RX ADMIN — Medication 0.36 MCG/KG/MIN: at 14:15

## 2021-01-01 RX ADMIN — PANTOPRAZOLE SODIUM 40 MG: 40 INJECTION, POWDER, FOR SOLUTION INTRAVENOUS at 08:33

## 2021-01-01 RX ADMIN — Medication 0.2 MCG/KG/MIN: at 06:48

## 2021-01-01 RX ADMIN — ATROPINE SULFATE 1 MG: 0.1 INJECTION, SOLUTION ENDOTRACHEAL; INTRAMUSCULAR; INTRAVENOUS; SUBCUTANEOUS at 00:00

## 2021-01-01 RX ADMIN — IOPAMIDOL 70 ML: 755 INJECTION, SOLUTION INTRAVENOUS at 23:32

## 2021-01-01 RX ADMIN — PIPERACILLIN AND TAZOBACTAM 3.38 G: 3; .375 INJECTION, POWDER, LYOPHILIZED, FOR SOLUTION INTRAVENOUS at 18:48

## 2021-01-01 RX ADMIN — VASOPRESSIN 2.4 UNITS/HR: 20 INJECTION INTRAVENOUS at 06:48

## 2021-01-01 RX ADMIN — CALCIUM GLUCONATE 1 G: 20 INJECTION, SOLUTION INTRAVENOUS at 08:33

## 2021-01-01 RX ADMIN — PROPOFOL 70 MCG/KG/MIN: 10 INJECTION, EMULSION INTRAVENOUS at 02:47

## 2021-01-01 RX ADMIN — Medication 0.03 MCG/KG/MIN: at 02:31

## 2021-01-01 RX ADMIN — DEXTROSE MONOHYDRATE 50 ML: 500 INJECTION PARENTERAL at 23:58

## 2021-01-01 RX ADMIN — SODIUM BICARBONATE 25 MEQ: 84 INJECTION INTRAVENOUS at 01:24

## 2021-01-01 RX ADMIN — Medication 1 MG/HR: at 07:03

## 2021-01-01 RX ADMIN — EPINEPHRINE 0.05 MCG/KG/MIN: 1 INJECTION PARENTERAL at 02:51

## 2021-01-01 RX ADMIN — Medication 50 MEQ: at 23:58

## 2021-01-01 RX ADMIN — PROPOFOL 10 MCG/KG/MIN: 10 INJECTION, EMULSION INTRAVENOUS at 22:57

## 2021-01-01 RX ADMIN — CHLORHEXIDINE GLUCONATE 15 ML: 1.2 SOLUTION ORAL at 12:13

## 2021-01-01 RX ADMIN — Medication 1 MCG/KG/MIN: at 07:37

## 2021-01-01 RX ADMIN — DEXTROSE MONOHYDRATE 50 ML: 500 INJECTION PARENTERAL at 02:40

## 2021-01-01 RX ADMIN — PIPERACILLIN AND TAZOBACTAM 3.38 G: 3; .375 INJECTION, POWDER, LYOPHILIZED, FOR SOLUTION INTRAVENOUS at 11:16

## 2021-01-01 RX ADMIN — Medication 3 UNITS/HR: at 12:47

## 2021-01-01 RX ADMIN — DEXTROSE MONOHYDRATE 50 ML: 500 INJECTION PARENTERAL at 04:56

## 2021-01-01 RX ADMIN — SODIUM BICARBONATE 50 MEQ: 84 INJECTION INTRAVENOUS at 07:36

## 2021-01-01 RX ADMIN — CALCIUM CHLORIDE 1 G: 100 INJECTION INTRAVENOUS; INTRAVENTRICULAR at 04:17

## 2021-01-01 RX ADMIN — Medication 50 MCG/HR: at 07:02

## 2021-01-01 RX ADMIN — PIPERACILLIN AND TAZOBACTAM 3.38 G: 3; .375 INJECTION, POWDER, LYOPHILIZED, FOR SOLUTION INTRAVENOUS at 00:22

## 2021-01-01 RX ADMIN — HUMAN ALBUMIN MICROSPHERES AND PERFLUTREN 6 ML: 10; .22 INJECTION, SOLUTION INTRAVENOUS at 12:45

## 2021-01-01 RX ADMIN — CALCIUM CHLORIDE, MAGNESIUM CHLORIDE, DEXTROSE MONOHYDRATE, LACTIC ACID, SODIUM CHLORIDE, SODIUM BICARBONATE AND POTASSIUM CHLORIDE 15 ML/KG/HR: 5.15; 2.03; 22; 5.4; 6.46; 3.09; .157 INJECTION INTRAVENOUS at 06:32

## 2021-01-01 RX ADMIN — Medication 3 UNITS/HR: at 13:03

## 2021-01-01 RX ADMIN — CHLORHEXIDINE GLUCONATE 15 ML: 1.2 SOLUTION ORAL at 20:24

## 2021-01-01 RX ADMIN — PROPOFOL 50 MCG/KG/MIN: 10 INJECTION, EMULSION INTRAVENOUS at 10:00

## 2021-01-01 RX ADMIN — EPINEPHRINE 0.03 MCG/KG/MIN: 1 INJECTION PARENTERAL at 06:41

## 2021-01-01 RX ADMIN — Medication: at 08:05

## 2021-01-01 RX ADMIN — SODIUM BICARBONATE 100 MEQ: 84 INJECTION INTRAVENOUS at 14:21

## 2021-01-01 RX ADMIN — SODIUM CHLORIDE 1000 ML: 9 INJECTION, SOLUTION INTRAVENOUS at 23:00

## 2021-01-01 RX ADMIN — ALBUMIN HUMAN 25 G: 50 SOLUTION INTRAVENOUS at 21:23

## 2021-01-01 RX ADMIN — VANCOMYCIN HYDROCHLORIDE 1500 MG: 100 INJECTION, POWDER, LYOPHILIZED, FOR SOLUTION INTRAVENOUS at 21:21

## 2021-01-01 ASSESSMENT — ACTIVITIES OF DAILY LIVING (ADL)
ADLS_ACUITY_SCORE: 12
ADLS_ACUITY_SCORE: 16
ADLS_ACUITY_SCORE: 12
ADLS_ACUITY_SCORE: 16
ADLS_ACUITY_SCORE: 16
ADLS_ACUITY_SCORE: 18
ADLS_ACUITY_SCORE: 16
ADLS_ACUITY_SCORE: 18
ADLS_ACUITY_SCORE: 16
ADLS_ACUITY_SCORE: 18
ADLS_ACUITY_SCORE: 16
ADLS_ACUITY_SCORE: 18
ADLS_ACUITY_SCORE: 16
ADLS_ACUITY_SCORE: 18
ADLS_ACUITY_SCORE: 16

## 2021-01-01 ASSESSMENT — MIFFLIN-ST. JEOR: SCORE: 1516

## 2021-12-11 PROBLEM — R57.0 CARDIOGENIC SHOCK (H): Status: ACTIVE | Noted: 2021-01-01

## 2021-12-11 PROBLEM — I46.9 CARDIAC ARREST (H): Status: ACTIVE | Noted: 2021-01-01

## 2021-12-11 NOTE — PROGRESS NOTES
Admitted/transferred from: [facility] ED  Reason for admission/transfer: VT/PEA arrest and need for ICU bed to cool pt.  2 RN skin assessment: completed by AM team to complete  Result of skin assessment and interventions/actions: N/A  Height, weight, drug calc weight: Done  Patient belongings (see Flowsheet)  MDRO education added to care planYes  ?

## 2021-12-11 NOTE — CONSULTS
Vascular Surgery Consult    Los Ariza MRN# [id number]   YOB: 1950 Age: 71 year old      Date of Admission:  12/11/2021        Consult for:    No DP/PT signals RLE s/p cardiac arrest        Assessment:     71 year old male admitted early this morning following witnessed cardiac arrest while shoveling snow. ROSC s/p ~15-20 minutes of CPR, epi, shocks. No cath. Being actively cooled, on pressors. Unknown baseline vascular exam. RLE arterial duplex shows no flow through PT, but he does have flow in AT. Exam and US not consistent with vascular event and more likely due to cooling and pressors. No need for heparin gtt at this time.         Plan:        No need for heparin gtt from vascular surgery standpoint    Recommend antiplatelet once safe from primary team standpoint    Discussed w/ staff, Dr. [place].    Poornima Nascimento MD  Surgery, PGY3  [id number]         History of Present Illness:    Los Ariza is a 71 year old male who was admitted overnight following witnessed cardiac arrest who vascular is consulted due to loss of DP/PT signals. Had cardiac arrest while shoveling snow. Per EMS, PEA. Had ROSC following 15-20min CPR w/ multiple shocks and epi. He is being cooled via Thermaguard and paralyzed. No cath lab. No ECMO. Not on heparin gtt. Lactate 5.5, pH 7.1.     Has a right femoral arterial line and central line in place. Unclear baseline or admission vascular exam, though this am unable to doppler right DP/PT signals. RLE arterial duplex shows stenosis at right popliteal and no flow through R PT, though R AT is patent.    Past Medical History:  Tobacco use (?unclear how recent)    Past Surgical History:  ORIF L zygoma fracture  Ex-lap, oversew perforated ulcer and appendectomy, 2009    Allergies:   No Known Allergies    Medications:  Unknown PTA medications    Social History:    ?smoker    Family History:  Unknown    ROS:  Limited due to patient's clinical  condition/sedation    Exam:  /71   Pulse 79   Temp (!) 93.4  F (34.1  C) (Bladder)   Resp 20   Ht 1.829 m (6')   Wt 71.3 kg (157 lb 3 oz)   SpO2 91%   BMI 21.32 kg/m    General: sedated  HEENT: does not track, ETT  Neck: trachea midline  Resp: mechanical ventilation  Cardiac: RRR, on pressors  Abdomen: soft, non-distended. Well healed prior upper midline scar.   Extremities: No groin or LE scars. R femoral arterial and CVC. No hematoma. 2+ bilateral femoral and popliteal pulses. Monophasic left PT signal. Unable to obtain left DP signal. Monophasic right AT signal. Unable to obtain right PT signal.  Skin: Cool, slightly mottled thighs and feet bilaterally  Neuro: sedated    Labs:  Most Recent CBC:   Recent Labs   Lab Test 12/11/21  0605   WBC 4.6   RBC 4.65   HGB 15.7   HCT 47.7   *   MCH 33.8*   MCHC 32.9   RDW 13.4        Most Recent BMP:   Recent Labs   Lab Test 12/11/21  1111 12/11/21  0757   NA  --  145*   POTASSIUM  --  4.3   CHLORIDE  --  118*   CO2  --  20   BUN  --  20   CR  --  1.24   GLC 94 103*       Imaging:  RLE Arterial Duplex  IMPRESSION:  1. RIGHT: Occluded right posterior tibial artery. The anterior tibial  artery is patent. Focal severe stenosis in the distal popliteal  artery, by velocity criteria.

## 2021-12-11 NOTE — H&P
[facility]  Cardiac ICU H&P  December 11, 2021          H&P:   Los Ariza is a 71 year old male who presents with cardiac arrest. The patient was shoveling snow at home when he had a witnessed cardiac arrest by wife. His wife began CPR immediately for 3-4 minutes before PD arrived. Police performed CPR for another 3-4 minutes before EMS arrival. EMS gave 2 shocks and had 3 unadvised shocks. EMS also gave 3 rounds of Epi and continued CPR for 10-12 minutes. Patient was then intubated, cooled with a thermaguard and had a femoral arterial line placed. Shar was contacted did not recommend cath lab at the time based on EKG, [facility]         Witnessed arest (y/n): yes  Home or public location (y/n): y  Bystander CPR (y/n): n  Time of 911 call: NA  Initial rhythm: PEA  Did they have intermittent ROSC (y/n): y  # of shocks: 5  Epi:  3  mg  Amio: 0 mg  Bicarb: 0 amps  EtCO2 en route:   O2 sat en route:               Past Medical History:   No past medical history on file.         Family History:   No family history on file.         Social History:     Social History     Socioeconomic History     Marital status:      Spouse name: Not on file     Number of children: Not on file     Years of education: Not on file     Highest education level: Not on file   Occupational History     Not on file   Tobacco Use     Smoking status: Not on file     Smokeless tobacco: Not on file   Substance and Sexual Activity     Alcohol use: Not on file     Drug use: Not on file     Sexual activity: Not on file   Other Topics Concern     Not on file   Social History Narrative     Not on file     Social Determinants of Health     Financial Resource Strain: Not on file   Food Insecurity: Not on file   Transportation Needs: Not on file   Physical Activity: Not on file   Stress: Not on file   Social Connections: Not on file   Intimate Partner Violence: Not on file   Housing Stability:  Not on file            Medications:   I have reviewed this patient's current medications  No medications prior to admission.            Review of Systems:   Unable to obtain ROS due to pt's obtunded status            Physical Exam:   Vital signs:                         There is no height or weight on file to calculate BMI.    GENERAL: intubated and sedated  HEENT: ET tube in place  CV: S1/S2 heard without murmur   RESPIRATORY: coarse breath sounds, no wheezes or crackles noted  GI: Soft and non distended with normoactive bowel sounds present in all quadrants. No tenderness, rebound, guarding. No palpable organomegaly.   EXTREMITIES: No peripheral edema. 2+ bilateral pedal pulses.   NEUROLOGIC: not oriented to place or time, does not follow commands  MUSCULOSKELETAL: No joint swelling or tenderness.   SKIN: No jaundice. No acute rashes or lesions.                 Data:     Lab Results   Component Value Date    WBC 10.7 12/10/2021    HGB 13.5 12/10/2021    HCT 44.5 12/10/2021     12/10/2021     12/10/2021    POTASSIUM 4.1 12/10/2021    CHLORIDE 106 12/10/2021    CO2 19 (L) 12/10/2021    BUN 17 12/10/2021    CR 1.39 (H) 12/10/2021     (H) 12/10/2021    NTBNPI 510 12/10/2021    TROPI <0.012 12/01/2009    AST 47 (H) 12/10/2021    ALT 43 12/10/2021    ALKPHOS 105 12/10/2021    BILITOTAL 0.4 12/10/2021    INR 1.24 (H) 12/10/2021       Recent Results (from the past 24 hour(s))   XR Chest Port 1 View    Narrative    EXAM: CHEST SINGLE VIEW PORTABLE  LOCATION: [facility]  DATE/TIME: 12/10/2021 11:14 PM    INDICATION: Status post intubation.  COMPARISON: 12/01/2009.    FINDINGS: An endotracheal tube is present with distal tip in the mid trachea, approximately 5 cm proximal to the victor m. An enteric drainage tube is present with distal tip not visualized, but at least to the stomach. A 0.8 cm round nodular opacity   projects of the lateral aspect of the mid right lung, between the  posterior aspects of the seventh and eighth ribs. No other convincing pulmonary opacities. Normal size cardiac silhouette. Atherosclerotic calcification in the thoracic aorta.      Impression    IMPRESSION:   1. An endotracheal tube and enteric drainage tube are in place.  2. No convincing evidence of acute cardiopulmonary disease.  3. Indeterminate 0.8 cm nodular opacity within the mid right lung, not visualized on the comparison study dated 12/01/2009. Recommend comparison with more recent imaging studies, if available. If not available, a CT scan of the chest is recommended for   further evaluation as neoplasm is possible.   Head CT w/o contrast    Narrative    EXAM: CT HEAD W/O CONTRAST, CT CERVICAL SPINE W/O CONTRAST  LOCATION: [facility]  DATE/TIME: 12/10/2021 11:27 PM    INDICATION: Confusion, head and neck injury  COMPARISON: None.  TECHNIQUE:   1) Routine CT Head without IV contrast. Multiplanar reformats. Dose reduction techniques were used.  2) Routine CT Cervical Spine without IV contrast. Multiplanar reformats. Dose reduction techniques were used.    FINDINGS:   HEAD CT:   INTRACRANIAL CONTENTS: No intracranial hemorrhage, extraaxial collection, or mass effect.  No CT evidence of acute infarct. Mild presumed chronic small vessel ischemic changes. Normal ventricles and sulci.     VISUALIZED ORBITS/SINUSES/MASTOIDS: No intraorbital abnormality. Mucosal thickening primarily involving the ethmoid air cells. No middle ear or mastoid effusion.    BONES/SOFT TISSUES: No acute abnormality.    CERVICAL SPINE CT:   VERTEBRA: Normal vertebral body heights. Slight anterior subluxation C4 on C5 and C7 on T1. Straightening of cervical lordosis. No fracture or posttraumatic subluxation.     CANAL/FORAMINA: Mild to moderate degenerative changes with mild canal narrowing at C6/C7. Neural foraminal narrowing: Mild right at C3-C4, mild left at C6-C7.    PARASPINAL: No extraspinal abnormality.  Visualized lung fields are clear.      Impression    IMPRESSION:  HEAD CT:  1.  No acute intracranial process.    CERVICAL SPINE CT:  1.  No CT evidence for acute fracture or post traumatic subluxation.   Cervical spine CT w/o contrast    Narrative    EXAM: CT HEAD W/O CONTRAST, CT CERVICAL SPINE W/O CONTRAST  LOCATION: [facility]  DATE/TIME: 12/10/2021 11:27 PM    INDICATION: Confusion, head and neck injury  COMPARISON: None.  TECHNIQUE:   1) Routine CT Head without IV contrast. Multiplanar reformats. Dose reduction techniques were used.  2) Routine CT Cervical Spine without IV contrast. Multiplanar reformats. Dose reduction techniques were used.    FINDINGS:   HEAD CT:   INTRACRANIAL CONTENTS: No intracranial hemorrhage, extraaxial collection, or mass effect.  No CT evidence of acute infarct. Mild presumed chronic small vessel ischemic changes. Normal ventricles and sulci.     VISUALIZED ORBITS/SINUSES/MASTOIDS: No intraorbital abnormality. Mucosal thickening primarily involving the ethmoid air cells. No middle ear or mastoid effusion.    BONES/SOFT TISSUES: No acute abnormality.    CERVICAL SPINE CT:   VERTEBRA: Normal vertebral body heights. Slight anterior subluxation C4 on C5 and C7 on T1. Straightening of cervical lordosis. No fracture or posttraumatic subluxation.     CANAL/FORAMINA: Mild to moderate degenerative changes with mild canal narrowing at C6/C7. Neural foraminal narrowing: Mild right at C3-C4, mild left at C6-C7.    PARASPINAL: No extraspinal abnormality. Visualized lung fields are clear.      Impression    IMPRESSION:  HEAD CT:  1.  No acute intracranial process.    CERVICAL SPINE CT:  1.  No CT evidence for acute fracture or post traumatic subluxation.   CT Chest (PE) Abdomen Pelvis w Contrast    Narrative    EXAM: CT CHEST PE ABDOMEN PELVIS W CONTRAST  LOCATION: [facility]  DATE/TIME: 12/10/2021 11:30 PM    INDICATION: cardiac arrest  COMPARISON:  12/01/2009  TECHNIQUE: CT chest pulmonary angiogram and routine CT abdomen pelvis with IV contrast. Arterial phase through the chest and venous phase through the abdomen and pelvis. Multiplanar reformats and MIP reconstructions were performed. Dose reduction   techniques were used.   CONTRAST: 70 mL Isovue 370    FINDINGS:  ANGIOGRAM CHEST: Pulmonary arteries are normal caliber and negative for pulmonary emboli. Thoracic aorta is negative for dissection. No CT evidence of right heart strain.     LUNGS AND PLEURA: Emphysema. Presumed subpleural atelectasis right upper lobe posteriorly series 7 image 75 and image 121. Respiratory motion. Mild scattered mucous plugging. No pleural effusion. 7 mm right middle lobe nodule series 7 image 186. Mild   atelectasis in the lung bases. Mild apical pleural-parenchymal scarring.    MEDIASTINUM/AXILLAE: Endotracheal and enteric tubes. Atherosclerotic aorta. Small hiatal hernia. No mediastinal adenopathy.    CORONARY ARTERY CALCIFICATION: Severe.    HEPATOBILIARY: 10 mm hypodensity medial left liver image 55, indeterminant.    PANCREAS: Normal.    SPLEEN: Normal.    ADRENAL GLANDS: Normal.    KIDNEYS/BLADDER: Subcentimeter focus of fatty attenuation in the mid left kidney suggestive of angiomyolipoma. No hydronephrosis. Subcentimeter hypodensity upper pole right kidney small for characterization. Sumner within the bladder. Bladder is   distended.    BOWEL: Tip of enteric tube within the stomach. Wall thickening of the right and transverse colon, greater in the right colon. Diverticulosis.    LYMPH NODES: Normal.    VASCULATURE: Diffuse atherosclerotic vascular calcification.    PELVIC ORGANS: Normal.    MUSCULOSKELETAL: Degenerative change osseous structures.      Impression    IMPRESSION:  1.  No pulmonary embolism.  2.  Wall thickening of the right and transverse colon. Underdistention of this region reduces evaluation. Findings could be due to colitis. Colonoscopy follow-up to  exclude focal colonic lesion recommended.  3.   8 mm right middle lobe nodule. Follow-up suggested. There are several subpleural areas of presumed atelectasis or scarring which can be reassessed on follow-up.  4.  Emphysema.  5.  Mild scattered mucous plugging.  6.  Diverticulosis.  7.  10 mm hypodensity medial left liver, nonspecific. Difficult to tell if this is a small focal area of fatty infiltration or small lesion. Nonurgent ultrasound follow-up can be obtained.    REFERENCE:  Guidelines for Management of Incidental Pulmonary Nodules Detected on CT Images: From the Fleischner Society 2017.   Guidelines apply to incidental nodules in patients who are 35 years or older.  Guidelines do not apply to lung cancer screening, patients with immunosuppression, or patients with known primary cancer.    SINGLE NODULE  Nodule size <6 mm  Low-risk patients: No follow-up needed.  High-risk patients: Optional follow-up at 12 months.    Nodule size 6-8 mm  Low-risk patients: Follow-up CT at 6-12 months, then consider CT at 18-24 months.  High-risk patients: Follow-up CT at 6-12 months, then at 18-24 months if no change.    Nodule size >8 mm  Either low or high-risk patients:  Consider CT, PET/CT, or tissue sampling at 3 months.    Consider referral to lung nodule clinic.                Assessment and Plan:   71 year old male who was admitted on (Not on file),     Neurology: Intubated, sedated, paralyzed. Cooled to 34 degrees.  --continue versed ggt, fentanyl ggt, and cis ggt as needed to maintain paralysis - RASS goal -4 to -5     Risk of anoxic brain injury:  Ct head negative   Cardiovascular / Hemodynamics: PEA arrest. Unclear if CAD invovled. Now s/p ROSC after 3mg of epi and 5 shock  TTE: pending  EKG: Unspecified ST changes   --wean pressors/inotropes as able  --Thermaguard in place   --hold temp at 34  --hold lipitor for now given likely hepatic injury during arrest  --hold ACE/ARB for now given likely reduced renal fxn  after arrest  --holding beta blocker given shock      Pulmonary: Hypoxic Respiratory Failure  ETT in the victor m.  Now weaning vent requirements.  CXR: Lines in stable position.   --wean vent as able  --daily CXR  --Q2h ABGs for now  --consider scheduled duonebs if signs of lung dz, currently PRN     GI and Nutrition: No known medical hx.   --monitor BID LFTs  --NPO while cooled - nutrition consult pending feeding tube placement once he is warmed   --bowel regimen - on hold for now due to hypothermia  --GI Prophylaxis: PPI    Renal, Fluid and Electrolytes: Cr  1.39 UOP pending   --monitor urine output  --maintain K>3 and Mg>2    Infectious Disease: Risk of aspiration PNA  No signs of infection. Leukocytosis c/w arrest. Blood cultures collected.   --vancomycin/zosyn x5 days for ECMO  --daily blood cultures  --monitor for signs of infection given cooling, lines, and leukocytosis   Hematology and Oncology: Risk of pancytopneia   --cryo PRN fibrinogen < 200; FFP for INR >2  --Transfuse for Hgb<7 protocol   --DVT PPX: Heparin as above   Endocrinology: Risk of stress hyperglycemia   No known medical history. BG elevated.  --insulin gtt if needed      Lines:   Rt Femoral arterial ine  Rt Femoral Thermaguard  ETT December 11, 2021  Sumner catheter December 11, 2021  OG tube December 11, 2021  Restraint: needed    Current lines are required for patient management       Family update by me today: Yes     Code Status: Full code     Staffed with Dr. Micah Esquivel MD  Cardiology Fellow  PGY6

## 2021-12-11 NOTE — ED TRIAGE NOTES
Pt presents via EMS for evaluation of cardiac arrest with ROSC. Pt was shoveling and had a witnessed arrest by wife. Wife started CPR immediately, was done for about 3-4 minutes before PD arrived. PD then took over CPR for another 3-4 minutes. EMS arrived, gave 2 shocks and had 3 unadvised shocks. PEA noted for rhythm. Gave epi x 3, continued CPR for 10-12 minutes. ROSC obtained with ST. IO placed in LLE. Given 5 mg versed for fighting I-gel airway. . 500 ml NS given.

## 2021-12-11 NOTE — CONSULTS
Nephrology Initial Consult  December 11, 2021      Los Ariza MRN:[id number] YOB: 1950  Date of Admission:12/11/2021  Primary care provider: No Ref-Primary, Physician  Requesting physician: Chriss Davison, *    ASSESSMENT AND RECOMMENDATIONS:   Mr. Watkins is a 71 year old male with no known PMH presented after cardiac arrest. Have had CPR by his wife followed by Police department and eventually acquired ROSC en route to OSH. Now undergoing angiogram, nephrology consulted for ongoing acidosis and intermittent hyperkalemia in setting of RACHANA.    Non oliguric RACHANA   Unclear baseline creatinine, presented with creatinine of 1.4. Made about 1L of urine since he is been here. Pt is now cooled after his cardiac arrest yesterday and his EF was significantly reduced with left ventricular hypokinesis. So he undergoing angiogram and anticipating to place him on ECMO. His RACHANA is likely 2/2 hemodynamic instability and cardiac failure, with continued pressor requirement and exposing to contrast may cause significant injury, so consulted nephrology.  - no acute need for RRT as pt was still making urine prior to his angiogram and electrolytes been stable.  - consent obtained from wife dana  - Please inform nephrology at [phone] if any acute indications for dialysis arises including but not limited to hyperkalemia, hypervolemia and or metabolic acidosis.    Volume status : does have peripheral volume, will continue to monitor his UOP for now.    Hyperkalemia : suspected some hemolysis. If truly high, likely multifactorial including myocyte lysis and or RACHANA. Down trended on repeat labs.  - will continue to monitor for now, if high, will consider RRT    Acidemia : ph is 7.1. combined respiratory and metabolic. Will repeat labs, will initiate  RRT when appropriate.    Recommendations were communicated to primary team via this note and verbally at bed side    Jillian Garcia MD   [phone]    REASON FOR  CONSULT: RACHANA with significant acidosis    HISTORY OF PRESENT ILLNESS:  Admitting provider and nursing notes reviewed  Los Ariza is a 71 year old with no known PMH as he is new to our system and also was intubated in out side hospital. Per history from primary team, pt was likely arrested during the snow shoveling and his wife did CPR for few min before PD came in and performed CPR and likely had shocks as well. Per report ROSC achieved by the time he reached out side hospital and transferred to [facility] for higher level of care.    Pt was intubated and sedated at the time of evaluation.    PAST MEDICAL HISTORY:    No past medical history on file., unable to assess as pt was intubated    No past surgical history on file., unable to assess as pt was intubated     MEDICATIONS:  PTA Meds  Prior to Admission medications    Not on File      Current Meds    artificial tears   Both Eyes Q8H     chlorhexidine  15 mL Swish & Spit BID     pantoprazole (PROTONIX) IV  40 mg Intravenous Daily     piperacillin-tazobactam  3.375 g Intravenous Q6H     sodium bicarbonate  100 mEq Intravenous Once     vancomycin  1,500 mg Intravenous Once    Followed by     [START ON 12/12/2021] vancomycin  1,250 mg Intravenous Q24H     Infusion Meds    EPINEPHrine 0.05 mcg/kg/min (12/11/21 1552)     fentaNYL 50 mcg/hr (12/11/21 1552)     midazolam 3 mg/hr (12/11/21 1552)     norepinephrine Stopped (12/11/21 1524)     phenylephrine       propofol (DIPRIVAN) infusion Stopped (12/11/21 1315)     sodium bicabonate in 5% dextrose for infusion 100 mL/hr at 12/11/21 1457     vasopressin Stopped (12/11/21 1552)       ALLERGIES:    No Known Allergies    REVIEW OF SYSTEMS:  Unable to assess as pt was intubated    SOCIAL HISTORY:   Social History     Socioeconomic History     Marital status:      Spouse name: Not on file     Number of children: Not on file     Years of education: Not on file     Highest education level: Not on file   Occupational  History     Not on file   Tobacco Use     Smoking status: Not on file     Smokeless tobacco: Not on file   Substance and Sexual Activity     Alcohol use: Not on file     Drug use: Not on file     Sexual activity: Not on file   Other Topics Concern     Not on file   Social History Narrative     Not on file     Social Determinants of Health     Financial Resource Strain: Not on file   Food Insecurity: Not on file   Transportation Needs: Not on file   Physical Activity: Not on file   Stress: Not on file   Social Connections: Not on file   Intimate Partner Violence: Not on file   Housing Stability: Not on file     FAMILY MEDICAL HISTORY:   No family history on file.  Unable to assess as pt was intubated    PHYSICAL EXAM:   Temp  Av  F (33.9  C)  Min: 90.3  F (32.4  C)  Max: 95.9  F (35.5  C)  Arterial Line BP  Min: 75/68  Max: 158/78  Arterial Line MAP (mmHg)  Av.1 mmHg  Min: 53 mmHg  Max: 109 mmHg      Pulse  Av.9  Min: 72  Max: 135 Resp  Av.2  Min: 7  Max: 40  FiO2 (%)  Av.5 %  Min: 50 %  Max: 100 %  SpO2  Av.7 %  Min: 76 %  Max: 100 %       /71   Pulse 85   Temp (!) 93  F (33.9  C) (Bladder)   Resp 22   Ht 1.829 m (6')   Wt 71.3 kg (157 lb 3 oz)   SpO2 99%   BMI 21.32 kg/m     Date 21 0700 - 21 0659   Shift 1366-0270 9494-6308 3551-0079 24 Hour Total   INTAKE   I.V. 731.98   731.98   Shift Total(mL/kg) 731.98(10.27)   731.98(10.27)   OUTPUT   Urine 265   265   Shift Total(mL/kg) 265(3.72)   265(3.72)   Weight (kg) 71.3 71.3 71.3 71.3      Admit Weight: 71.3 kg (157 lb 3 oz)     General : Pt sedated and intubated, not in acute distress   Lungs : anterior lung fields are clear  Cardiac : S1, S2 present  Abdomen : Soft/ND/NT  LE : Edema noted  Dialysis Access : N/a    LABS:   CMP  Recent Labs   Lab 21  1600 21  1452 21  1400 21  1323 21  1111 21  1103 21  0757 21  0605 21  0545 12/10/21  2305   * 148*  --    --   --   --  145* 138  --  139   POTASSIUM 4.0 3.8  --   --   --   --  4.3 6.1*   < > 4.1   CHLORIDE  --   --   --   --   --   --  118* 110*  --  106   CO2  --   --   --   --   --   --  20 21  --  19*   ANIONGAP  --   --   --   --   --   --  7 7  --  14   * 82 83 93   < >  --  103* 144*   < > 225*   BUN  --   --   --   --   --   --  20 23  --  17   CR  --   --   --   --   --   --  1.24 1.45*  --  1.39*   GFRESTIMATED  --   --   --   --   --   --  59* 49*  --  51*   NEAL  --   --   --   --   --   --  5.8* 7.5*  --  8.3*   MAG  --   --   --   --   --  2.6*  --   --   --   --    PHOS  --   --   --   --   --  8.4*  --   --   --   --    PROTTOTAL  --   --   --   --   --   --   --  6.6*  --  6.2*   ALBUMIN  --   --   --   --   --   --   --  3.2*  --  3.0*   BILITOTAL  --   --   --   --   --   --   --  0.6  --  0.4   ALKPHOS  --   --   --   --   --   --   --  98  --  105   AST  --   --   --   --   --   --   --  108*  --  47*   ALT  --   --   --   --   --   --   --  50  --  43    < > = values in this interval not displayed.     CBC  Recent Labs   Lab 12/11/21  1600 12/11/21  1536 12/11/21  1452 12/11/21  0605 12/10/21  2305   HGB 12.5* 12.5* 13.8 15.7 13.5   WBC  --   --   --  4.6 10.7   RBC  --   --   --  4.65 4.10*   HCT  --   --   --  47.7 44.5   MCV  --   --   --  103* 109*   MCH  --   --   --  33.8* 32.9   MCHC  --   --   --  32.9 30.3*   RDW  --   --   --  13.4 13.2   PLT  --   --   --  193 189     INR  Recent Labs   Lab 12/10/21  2305   INR 1.24*   PTT 40*     ABG  Recent Labs   Lab 12/11/21  1600 12/11/21  1452 12/11/21  1351 12/11/21  1238 12/11/21  1104 12/11/21  0753   PH 7.16* 7.11* 7.07* 7.13* 7.10* 7.15*   PCO2 49* 61* 59* 63* 67* 57*   PO2 122* 110* 90 93 101 201*   HCO3 17* 19* 17* 21 21 20*   O2PER  --   --  50 50 50 70      URINE STUDIES  Recent Labs   Lab Test 12/11/21  0631 12/10/21  2321   COLOR Brown* Light Yellow   APPEARANCE Slightly Cloudy* Clear   URINEGLC 70 * 100 *   URINEBILI Negative  Negative   URINEKETONE Negative Negative   SG 1.021 1.016   UBLD Large* Moderate*   URINEPH 6.0 6.5   PROTEIN 30 * 70 *   NITRITE Negative Negative   LEUKEST Moderate* Negative   RBCU >182* 4*   WBCU >182* 12*     Imaging   ECHO   Interpretation Summary  Left ventricular function is severely reduced. Biplane LVEF is 18%. Severe  diffuse hypokinesis is present.  Global right ventricular function is moderately to severely reduced.  No hemodynamically significant valve abnormalities.  No pericardial effusion is present.  There is no prior study for direct comparison    Jillian Garcia MD

## 2021-12-11 NOTE — PROGRESS NOTES
8fr 50 cc IABP placed in pt.'s LFA by M.D. in Cath Lab. Pt transported to unit 4e with no complications

## 2021-12-11 NOTE — PROGRESS NOTES
Vascular Staff    Full consult note to follow however patient has normal arterial waveforms at ankle on right. No current Vascular needs. Please call us if questions.    Desirae Colorado MD, DFSVS, RPVI  Director, [facility]  Chief, Vascular and Endovascular Surgery  [facility]  [email or web address]  Securely message with the Vocera Web Console (learn more here)

## 2021-12-11 NOTE — ED PROVIDER NOTES
History   Chief Complaint:  Cardiac Arrest       HPI History is limited due to critical illness  Los Ariza is a 71 year old male who presents with cardiac arrest. The patient was shoveling snow at home when he had a witnessed cardiac arrest by wife. His wife began CPR immediately for 3-4 minutes before PD arrived. Police performed CPR for another 3-4 minutes before EMS arrival. EMS gave 2 shocks and had 3 unadvised shocks. EMS also gave 3 rounds of Epi and continued CPR for 10-12 minutes.      Review of Systems   Unable to perform ROS: Acuity of condition       Allergies:  The patient does not have any allergies    Medications:  The patient is currently on no regular medications.    Past Medical History:     No other significant past medical history or family history.    Social History:  Arrived via EMS  Smoker    Physical Exam     Patient Vitals for the past 24 hrs:   BP Temp Temp src Pulse Resp SpO2 Weight   12/11/21 0400 91/58 (!) 91.8  F (33.2  C) -- 88 16 100 % --   12/11/21 0355 (!) 87/56 (!) 91.8  F (33.2  C) -- 86 16 100 % --   12/11/21 0345 -- (!) 91.8  F (33.2  C) -- -- -- -- --   12/11/21 0340 (!) 83/51 (!) 91.8  F (33.2  C) -- 74 (!) 33 100 % --   12/11/21 0335 (!) 77/22 -- -- 77 28 100 % --   12/11/21 0330 (!) 83/26 (!) 91.8  F (33.2  C) -- 82 (!) 33 100 % --   12/11/21 0325 (!) 89/54 -- -- 89 29 100 % --   12/11/21 0320 90/62 (!) 91.8  F (33.2  C) -- 92 28 100 % --   12/11/21 0315 93/57 (!) 91.9  F (33.3  C) -- 95 30 100 % --   12/11/21 0310 98/68 (!) 91.9  F (33.3  C) -- 94 25 100 % --   12/11/21 0250 (!) 89/55 (!) 92.1  F (33.4  C) -- 81 25 100 % --   12/11/21 0245 (!) 85/55 (!) 92.1  F (33.4  C) -- 75 23 100 % --   12/11/21 0240 (!) 85/51 (!) 92.3  F (33.5  C) -- 77 22 100 % --   12/11/21 0235 (!) 84/54 (!) 92.3  F (33.5  C) -- 77 22 100 % --   12/11/21 0230 (!) 83/56 (!) 92.3  F (33.5  C) -- 77 23 100 % --   12/11/21 0225 (!) 82/51 (!) 92.5  F (33.6  C) -- 78 27 100 % --   12/11/21 0220 (!)  88/59 (!) 92.5  F (33.6  C) -- 79 21 100 % --   12/11/21 0215 93/44 (!) 92.7  F (33.7  C) -- 78 23 100 % --   12/11/21 0210 95/54 (!) 92.8  F (33.8  C) -- 79 27 100 % --   12/11/21 0205 97/59 (!) 93  F (33.9  C) -- 80 23 100 % --   12/11/21 0200 95/58 (!) 93  F (33.9  C) -- 80 24 100 % --   12/11/21 0155 91/51 (!) 93.2  F (34  C) -- 80 22 100 % --   12/11/21 0150 90/55 (!) 93.4  F (34.1  C) -- 80 23 100 % --   12/11/21 0145 92/56 (!) 93.6  F (34.2  C) -- 80 (!) 40 100 % --   12/11/21 0140 90/57 (!) 93.7  F (34.3  C) -- 80 (!) 31 100 % --   12/11/21 0135 96/57 (!) 93.9  F (34.4  C) -- 82 20 100 % --   12/11/21 0130 105/63 (!) 93.9  F (34.4  C) -- 84 29 100 % --   12/11/21 0125 111/64 (!) 94.1  F (34.5  C) -- 85 28 100 % --   12/11/21 0120 117/65 (!) 94.1  F (34.5  C) -- 87 30 100 % --   12/11/21 0115 110/64 (!) 94.1  F (34.5  C) -- 93 (!) 31 100 % --   12/11/21 0110 109/63 -- -- 90 29 100 % --   12/11/21 0105 115/63 (!) 94.1  F (34.5  C) -- 91 (!) 31 100 % --   12/11/21 0025 (!) 160/85 (!) 93.9  F (34.4  C) -- 90 16 100 % --   12/11/21 0020 (!) 161/86 (!) 93.7  F (34.3  C) -- 89 16 100 % --   12/11/21 0015 (!) 152/90 (!) 93.7  F (34.3  C) -- 91 10 100 % --   12/11/21 0010 (!) 162/86 (!) 93.7  F (34.3  C) -- 92 10 100 % --   12/11/21 0005 (!) 150/93 (!) 92.5  F (33.6  C) -- 92 11 100 % --   12/11/21 0000 (!) 166/94 (!) 93.7  F (34.3  C) -- 97 (!) 7 100 % --   12/10/21 2355 (!) 155/92 (!) 90.3  F (32.4  C) -- 90 16 100 % --   12/10/21 2350 (!) 140/75 -- -- 96 16 100 % --   12/10/21 2320 -- (!) 95.5  F (35.3  C) -- (!) 121 -- 97 % --   12/10/21 2315 -- (!) 94.8  F (34.9  C) -- (!) 126 9 96 % --   12/10/21 2310 (!) 169/97 -- -- (!) 129 (!) 7 95 % --   12/10/21 2305 (!) 161/96 -- -- (!) 131 14 97 % --   12/10/21 2300 (!) 153/92 -- -- (!) 128 16 96 % --   12/10/21 2255 (!) 145/81 -- -- (!) 135 -- 97 % --   12/10/21 2249 (!) 166/102 (!) 95.9  F (35.5  C) Temporal 112 22 100 % 70 kg (154 lb 5.2 oz)       Physical  Exam  Constitutional:  GCS 3. Unresponsive. Agonal breathing. Minor jerking movement of neck.  HENT:    Head:    Normocephalic.   Mouth/Throat:   Oropharynx is clear and moist.   Eyes:    Pupils are dilated. Minimally reactive. Equal.  Neck:    Neck supple.   Cardiovascular:  Normal rate, regular rhythm and normal heart sounds.      Exam reveals no gallop and no friction rub.       No murmur heard.  Pulmonary/Chest:  Effort normal and breath sounds normal.      No respiratory distress. No wheezes. No rales.      No reproducible chest wall pain.  Abdominal:   Soft. No distension. No tenderness. No rebound and no guarding.   Musculoskeletal:  Normal range of motion. 2+ distal equal pulses.  Neurological:   GCS 3. Infrequent attempts of respiration. Not moving extremities.  Skin:    No rash noted. No pallor. No ecchymosis.    Emergency Department Course   ECG #1  ECG obtained at 2249, ECG read at 2249  Sinus tachycardia with 1st degree AV block  ST & T wave abnormality, consider inferior ischemia  Abnormal ECG  Rate 113 bpm. WV interval 248 ms. QRS duration 104 ms. QT/QTc 354/485 ms. P-R-T axes 93 86 267.    ECG #2  ECG taken at 2250, ECG read at 2250  Sinus tachycardia with 1st degree AV block with premature atrial complexes with aberrant conduction  ST & T wave abnormality, consider inferior ischemia  Abnormal ECG  Rate 113 bpm. WV interval 240 ms. QRS duration 102 ms. QT/QTc 346/474 ms. P-R-T axes 92 87 265.     ECG #3  ECG taken at 0100, ECG read at 0105  Sinus rhythm with 1st degree AV block  T wave abnormality, consider inferior ischemia  Abnormal ECG  Rate 91 bpm. WV interval 222 ms. QRS duration 106 ms. QT/QTc 364/447 ms. P-R-T axes 80 79 -11.       Imaging:  CT Chest (PE) Abdomen Pelvis w Contrast   Final Result   IMPRESSION:   1.  No pulmonary embolism.   2.  Wall thickening of the right and transverse colon. Underdistention of this region reduces evaluation. Findings could be due to colitis. Colonoscopy  follow-up to exclude focal colonic lesion recommended.   3.   8 mm right middle lobe nodule. Follow-up suggested. There are several subpleural areas of presumed atelectasis or scarring which can be reassessed on follow-up.   4.  Emphysema.   5.  Mild scattered mucous plugging.   6.  Diverticulosis.   7.  10 mm hypodensity medial left liver, nonspecific. Difficult to tell if this is a small focal area of fatty infiltration or small lesion. Nonurgent ultrasound follow-up can be obtained.      REFERENCE:   Guidelines for Management of Incidental Pulmonary Nodules Detected on CT Images: From the Fleischner Society 2017.    Guidelines apply to incidental nodules in patients who are 35 years or older.   Guidelines do not apply to lung cancer screening, patients with immunosuppression, or patients with known primary cancer.      SINGLE NODULE   Nodule size <6 mm   Low-risk patients: No follow-up needed.   High-risk patients: Optional follow-up at 12 months.      Nodule size 6-8 mm   Low-risk patients: Follow-up CT at 6-12 months, then consider CT at 18-24 months.   High-risk patients: Follow-up CT at 6-12 months, then at 18-24 months if no change.      Nodule size >8 mm   Either low or high-risk patients:   Consider CT, PET/CT, or tissue sampling at 3 months.      Consider referral to lung nodule clinic.         Cervical spine CT w/o contrast   Final Result   IMPRESSION:   HEAD CT:   1.  No acute intracranial process.      CERVICAL SPINE CT:   1.  No CT evidence for acute fracture or post traumatic subluxation.      Head CT w/o contrast   Final Result   IMPRESSION:   HEAD CT:   1.  No acute intracranial process.      CERVICAL SPINE CT:   1.  No CT evidence for acute fracture or post traumatic subluxation.      XR Chest Port 1 View   Final Result   IMPRESSION:    1. An endotracheal tube and enteric drainage tube are in place.   2. No convincing evidence of acute cardiopulmonary disease.   3. Indeterminate 0.8 cm nodular  opacity within the mid right lung, not visualized on the comparison study dated 12/01/2009. Recommend comparison with more recent imaging studies, if available. If not available, a CT scan of the chest is recommended for    further evaluation as neoplasm is possible.        Report per radiology    Laboratory:  Labs Ordered and Resulted from Time of ED Arrival to Time of ED Departure   BASIC METABOLIC PANEL - Abnormal       Result Value    Sodium 139      Potassium 4.1      Chloride 106      Carbon Dioxide (CO2) 19 (*)     Anion Gap 14      Urea Nitrogen 17      Creatinine 1.39 (*)     Calcium 8.3 (*)     Glucose 225 (*)     GFR Estimate 51 (*)    CBC WITH PLATELETS AND DIFFERENTIAL - Abnormal    WBC Count 10.7      RBC Count 4.10 (*)     Hemoglobin 13.5      Hematocrit 44.5       (*)     MCH 32.9      MCHC 30.3 (*)     RDW 13.2      Platelet Count 189      % Neutrophils 46      % Lymphocytes 42      % Monocytes 5      % Eosinophils 2      % Basophils 1      % Immature Granulocytes 4      NRBCs per 100 WBC 0      Absolute Neutrophils 5.1      Absolute Lymphocytes 4.4      Absolute Monocytes 0.6      Absolute Eosinophils 0.2      Absolute Basophils 0.1      Absolute Immature Granulocytes 0.4      Absolute NRBCs 0.0     HEPATIC FUNCTION PANEL - Abnormal    Bilirubin Total 0.4      Bilirubin Direct 0.1      Protein Total 6.2 (*)     Albumin 3.0 (*)     Alkaline Phosphatase 105      AST 47 (*)     ALT 43     INR - Abnormal    INR 1.24 (*)    PARTIAL THROMBOPLASTIN TIME - Abnormal    aPTT 40 (*)    ROUTINE UA WITH MICROSCOPIC - Abnormal    Color Urine Light Yellow      Appearance Urine Clear      Glucose Urine 100  (*)     Bilirubin Urine Negative      Ketones Urine Negative      Specific Gravity Urine 1.016      Blood Urine Moderate (*)     pH Urine 6.5      Protein Albumin Urine 70  (*)     Urobilinogen Urine Normal      Nitrite Urine Negative      Leukocyte Esterase Urine Negative      Mucus Urine Present (*)   "   Sperm Urine Present (*)     RBC Urine 4 (*)     WBC Urine 12 (*)    BLOOD GAS VENOUS WITH OXYHEMOGLOBIN - Abnormal    pH Venous 7.07 (*)     pCO2 Venous 91 (*)     pO2 Venous 61 (*)     Bicarbonate Venous 26      FIO2 90      Oxyhemoglobin Venous 77 (*)     Base Excess/Deficit (+/-) -6.9     TROPONIN I - Abnormal    Troponin I High Sensitivity 10,499 (*)    TROPONIN I - Normal    Troponin I High Sensitivity 69     ISTAT TROPONIN POCT - Normal    TROPPC POCT 0.07     NT PROBNP INPATIENT - Normal    N terminal Pro BNP Inpatient 510     COVID-19 VIRUS (CORONAVIRUS) BY PCR - Normal    SARS CoV2 PCR Negative     LACTIC ACID WHOLE BLOOD - Normal    Lactic Acid 1.3     BLOOD GAS ARTERIAL WITH OXYHEMOGLOBIN   LACTIC ACID WHOLE BLOOD   TYPE AND SCREEN, ADULT    ABO/RH(D) O POS      Antibody Screen Negative      SPECIMEN EXPIRATION DATE [id number]     BLOOD CULTURE   BLOOD CULTURE   ABO/RH TYPE AND SCREEN        Procedures     Intubation      INDICATION: Acute respiratory failure. and Airway protection.    PERFORMED BY: Quincy Stone MD    CONSENT: The procedure was performed in an emergent situation.    TIMEOUT: Immediately prior to procedure a \"time out\" was called to verify the correct patient, procedure, equipment, support staff and site/side marked as required.    INTUBATION METHOD: Glidescope    PATIENT STATUS: RSI    PREOXYGENATION: Mask    PRETREATMENT MEDICATIONS: None    SEDATIVES: Etomidate    PARALYTIC: succinylcholine    LARYNGOSCOPE SIZE: Mac 4    TUBE SIZE: 7.5 cuffed with cuff inflated after placement  Number of attempts: 1  Cricoid pressure: yes  Cords visualized: yes    POST-PROCEDURE ASSESSMENT: Breath sounds equal bilaterally with chest rise and absent over the epigastrium, Chest x-ray interpreted by me demonstrating endotracheal tube in appropriate position and CO2 detector.    ETT TO TEETH: 25 cm  Tube secured with: ETT fierro    Patient tolerated the procedure well with no immediate " complications.  COMPLICATIONS:  None        Central Line Placement     Procedure:  Central Line Placement with Ultrasound Guidance.    Indications: Vascular access    Consent:  Risks (including but not limited to: pneumothorax,bleeding, infection or artery puncture), benefits and alternatives were discussed with  unable to obtain due to emergency conditions and consent for procedure was obtained.    Timeout:  Universal protocol was followed. TIME OUT conducted just prior to starting procedure confirmed patient identity, site/side, procedure, patient position, and availability of correct equipment and implants.?  Yes    Procedure note:  Right Femoral and the right femoral area was prepped, cleansed and draped in a sterile fashion.  Mask, gown and gloves were used per sterile protocol.  Patient was then placed into Trendelenburg position and lidocaine was used for local anesthesia.  Vascular probe with ultrasound was used in a sterile fashion for guidence.  Introducer needle was then used to gain access to the central venous circulation.  Using Seldinger technique the  Triple lumen catheter was placed.  Catheter port(s) were aspirated and flushed.  Central line was sutured in place and sterile dressing applied.     Patient Status:  Patient tolerated the procedure well.  There were no complications.          Arterial Line Insertion      INDICATION: Continuous blood pressure monitoring    ANESTHESIA:  1% lidocaine    CONSENT:   Risks (including but not limited to: bleeding, infection or pain), benefits, and alternatives were discussed with unable to obtain due to emergency conditions and consent for procedure was obtained.      [place] protocol was followed. TIME OUT conducted just prior to    starting the procedure confirmed patient identity, site/side, procedure,    patient position, abd availability of correct equipment and implants. Yes    The skin overlying the right femoral artery was prepped and draped in a  sterile fashion. The artery was entered with a finder needle through which a guidewire was inserted. The needle was then removed and a  arterial cannula was inserted over the guidewire without difficulty. The guidewire was removed and the catheter was sutured to the underlying skin. The patient tolerated the procedure well and there were no immediate complications.    PATIENT STATUS: The patient tolerated the procedure well and there were no complications.         FAST Ultrasound     INDICATION: Cardiacarrest    PERFORMING PHYSICIAN: Quincy Stone MD    VIEWS/TECHNIQUE:  Four quadrants (perihepatic, perisplenic, pelvic, pericardial) of the abdomen were scanned     The exam was extended to the chest: serial images of each lung were taken in m-mode. There was a normal sliding lung sign with normal m-mode scanning, no evidence of pneumothorax.         INTERPRETATION:   Negative for free fluid in the visualized areas.,       IMAGE NOTES: The quality of the exam was good.  The images were printed and attached for inclusion in the patients medical record.       Emergency Department Course:     Reviewed:  I reviewed nursing notes, vitals and past medical history    Assessments:  2245 I obtained history and examined the patient as noted above.    2319 I rechecked the patient.    2315 I spoke with the patient's wife. She reports that the patient fell and may have hit his head.    2325 I rechecked the patient.    0000 I performed the intubation, central line placement, and arterial line insertion.    0104 I rechecked the patient.    0312 I rechecked the patient.    Consults:  0057 I spoke with general cardiology regarding the patient's presentation    0108 I spoke with [facility] regarding patient placement.    0204 I spoke with Interventional Cardiologist at [facility], who said to hold on heparin. No ICU beds available.    0247 I spoke with Dr. Davison, Intensivist, regarding the patient's  presentation.    Interventions:  2253 Amidate 20 mg IV    2253 Rocuronium 100 mg IV    2257 Propofol 10 mcg/kg/min IV    2300 0.9% sodium chloride 1000 mL IV    0101 Propofol 50 mcg/kg/min IV    0110 Propofol 60 mcg/kg/min IV    0119 Propofol 70 mcg/kg/min IV    0147 Aspirin 325 mg PO    0152 Aspirin 325 mg PO    0231 Norepinephrine 4 mg in 250 mL IV    Disposition:  The patient was transferred to [facility]. Dr. David Menendez accepted the patient for transfer.     Impression & Plan     CMS Diagnoses: The Lactic acid level is elevated due to cardiac arrest, at this time there is no sign of severe sepsis or septic shock. and None    Medical Decision Making:  Los Ariza is a 71 year old male that came in status post cardiac arrest with AED that advised shocking x2 followed by PE. Questionable trauma per wife as he was shoveling the snow and collapsed. Differential is quite wide, includes primary arrhythmia, aortic dissection, pulmonary embolism, intracranial hemorrhage, abdominal catastrophe, stroke, or other causes. Workup does not point towards obvious cause. CT of his head, cervical spine, chest, abdomen, pelvis, does not point towards obvious catastrophic cause. His EKG shows nonspecific changes with no obvious ST elevation MI. Initial troponin is not elevated. Patient was intubated by myself as well as central line and arterial line and is currently being cooled for therapeutic hypothermia. Please see procedure notes. As I am unable to find an obvious cause with his shock advised by his AED, I assume this was Vfib or Vtach until proven otherwise, and management will likely consist of looking for possible CAD as a primary cause. I did discuss the case with Interventional Cardiologist at [facility] who requested aspirin to be given and to hold on heparin. There were no ICU beds available at [facility]. The patient was accepted at [facility] where he will be transferred for further interventional cardiology consultation and ICU  admission.    Critical Care Time: was 95 minutes for this patient excluding procedures    Diagnosis:    ICD-10-CM    1. Cardiac arrest (H)  I46.9        Discharge Medications:  New Prescriptions    No medications on file       Scribe Disclosure:  I, Marko Torsten, am serving as a scribe at 10:47 PM on 12/10/2021 to document services personally performed by Quincy Stone, based on my observations and the provider's statements to me.         Quincy Barajas MD  12/11/21 0642

## 2021-12-11 NOTE — PROGRESS NOTES
[facility] ICU VENTILATOR RESPIRATORY NOTE    Date of Admission:12/10/21    Date of Intubation (most recent):12/10/21    Reason for Mechanical Ventilation:Cardiac Arrest    Number of Days on Mechanical Ventilation: 1    Plan:  Transfer    Ventilation Mode: CMV/AC  (Continuous Mandatory Ventilation/ Assist Control)  FiO2 (%): 100 %  Rate Set (breaths/minute): 16 breaths/min  Tidal Volume Set (mL): 450 mL  PEEP (cm H2O): 5 cmH2O  Oxygen Concentration (%): 100 %  Resp: 16

## 2021-12-11 NOTE — ED NOTES
Bed: ED03  Expected date:   Expected time:   Means of arrival:   Comments:  Nelda-70 y/o M post-arrest

## 2021-12-11 NOTE — Clinical Note
IABP inserted in the left femoral artery. IABP inserted with 50 cc balloon volume Lot number is: [id number]

## 2021-12-11 NOTE — PLAN OF CARE
Admitted/transferred from: [facility]   Reason for admission/transfer: Cardiac Arrest  Patient status upon admission/transfer:Patient arrived at 0530, night shift received admit.  Patient received by writer at 0700, actively cooled, intubated, ventilated, RASS of -5.     Interventions: Active cooling, close monitoring of ABGs, potassium and lactic acid.  Plan: Keep patient stable, wean pressors as tolerated.    2 RN skin assessment: completed by Oscar BONNER RN and Lane WOLF  Result of skin assessment and interventions/actions: Mottled BLLE and BLUE.  Blanchable erythema to extremities. Scrotal bruising and mottling.  Rash on right sided chest/nipple region.  Left tibial former IO site, removed by night shift, dressing in place, CDI.    Height, weight, drug calc weight: Done  Patient belongings (see Flowsheet - Adult Profile for details): Wedding ring in med-safe, RM [phone].  MDRO education (if applicable): N/a

## 2021-12-11 NOTE — CONSULTS
[facility]  Neurocritical Care Consult    Patient: Los Ariza  MRN: [id number]  : 1950      Reason for Consult:  Cardiac Arrest  Consulting Provider:  Chriss Davison MD    History of Present Illness:   Los Ariza is a 71 year old male with unknown PMHx who presents to hospital after witnessed cardiac arrest. He was at home shoveling snow when his wife saw him collapse. She thinks he may have hit his head when he fell. She called 911 and immediately began CPR. She performed CPR for 3-4 minutes until police arrived, who took over CPR for another 3-4 minutes before EMS arrival. EMS noted PEA for rhythm and continued CPR for another 10-12 minutes, involving 3 rounds of epi and 2 shocks plus 3 unadvised shocks. ROSC was obtained. Patient given Versed 5 mg for I-gel airway placement and was ultimately intubated, had femoral arterial line placed, and was cooled via Thermaguard (targeted therapeutic hypothermia achieved 0530). Per report, Shar was contacted and did not recommend cath lab at the time based on EKG. Patient brought to [facility] for post-arrest management.     Review of Systems:   Not obtained due to patient intubated and sedated.    Past Medical History:  Diverticulosis  Perforated gastric ulcer  Alcohol use  Tobacco use  Emphysema    Past Surgical History:  Exploratory laparotomy (2009)  Appendectomy (2009)  Left zygomatic fracture s/p ORIF    Family History:  None    Social History:  Tobacco Use     Smoking status: Current Every Day Smoker     Smokeless tobacco: Not on file   Substance and Sexual Activity     Alcohol use: Not on file     Drug use: Not on file       Objective Data:    24 Hour Vital Signs Summary:  Temperatures:  Current -  ; Max - Temp  Av  F (33.9  C)  Min: 90.3  F (32.4  C)  Max: 95.9  F (35.5  C)  Respiration range: Resp  Av.3  Min: 7  Max: 40  Pulse range: Pulse  Av.1  Min: 74  Max: 135  Blood pressure range:  Systolic (24hrs), Av , Min:77 , Max:169;   Diastolic (24hrs), Av, Min:22, Max:102  Pulse oximetry range: SpO2  Av.5 %  Min: 95 %  Max: 100 %    Ventilator Settings  Ventilation Mode: CMV/AC  (Continuous Mandatory Ventilation/ Assist Control)  FiO2 (%): 50 %  Rate Set (breaths/minute): 18 breaths/min  Tidal Volume Set (mL): 450 mL  PEEP (cm H2O): 7 cmH2O  Oxygen Concentration (%): 50 %  Resp: 16      Arterial Line BP: ()/(52-96) 83/78  MAP:  [70 mmHg-109 mmHg] 79 mmHg  BP - Mean:  [] 74    Current Medications:    amiodarone  150 mg Intravenous Once     artificial tears   Both Eyes Q8H     pantoprazole (PROTONIX) IV  40 mg Intravenous Daily     piperacillin-tazobactam  3.375 g Intravenous Q6H       PRN Medications:  calcium chloride, lidocaine 4%, lidocaine (buffered or not buffered), magnesium sulfate, magnesium sulfate, meperidine, midazolam, norepinephrine, potassium chloride, sodium chloride (PF), sodium chloride (PF), sodium phosphate, sodium phosphate, sodium phosphate, sodium phosphate    Infusions:    amiodarone       amiodarone       cisatracurium       EPINEPHrine       fentaNYL       midazolam       norepinephrine       phenylephrine       propofol (DIPRIVAN) infusion       vasopressin         Allergies:  No Known Allergies    Physical Examination:  There were no vitals taken for this visit.    Exam:  Examined while sedated on propofol 50 mcg/kg/min propofol. On, exam patient is intubated, sedated, and cooled to 32.5 degrees C. Unresponsive. Pupils 1 mm and non-reactive to light bilaterally. No oculocephalic response, cough, or gag. No spontaneous movement. Does not withdraw or respond hemodynamically to noxious stimuli.    Labs/Studies:  Recent Labs   Lab Test 21  0545 12/10/21  2305   NA  --  139   POTASSIUM  --  4.1   CHLORIDE  --  106   CO2  --  19*   ANIONGAP  --  14   * 225*   BUN  --  17   CR  --  1.39*   NEAL  --  8.3*   WBC  --  10.7   RBC  --  4.10*   HGB  --   "13.5   PLT  --  189       Recent Labs   Lab Test 12/10/21  2305   INR 1.24*   PTT 40*       Recent Labs   Lab 12/11/21  0604 12/11/21  0110   O2PER 70 90       Imaging and labs reviewed personally in EMR.    CT HEAD W/O CONTRAST  12/10/2021 11:27 PM  \"IMPRESSION: No acute intracranial process.\"     CT CHEST PE ABDOMEN PELVIS W/ CONTRAST  12/10/2021 11:30 PM  \"IMPRESSION:  1.  No pulmonary embolism.  2.  Wall thickening of the right and transverse colon. Underdistention of this region reduces evaluation. Findings could be due to colitis. Colonoscopy follow-up to exclude focal colonic lesion recommended.  3.   8 mm right middle lobe nodule. Follow-up suggested. There are several subpleural areas of presumed atelectasis or scarring which can be reassessed on follow-up.  4.  Emphysema.  5.  Mild scattered mucous plugging.  6.  Diverticulosis.  7.  10 mm hypodensity medial left liver, nonspecific. Difficult to tell if this is a small focal area of fatty infiltration or small lesion. Nonurgent ultrasound follow-up can be obtained.\"    Assessment:  71 year old male with history of tobacco and alcohol use and no known past history of coronary artery disease who presents after out-of-hospital PEA arrest with immediate bystander CPR. He has not undergone coronary angiography. Exam is notable for absence of any brainstem reflexes or other response. CT head, chest, abdomen, and pelvis are unrevealing. Unable to neuroprognosticate based on exam while sedated and cooled. Neurocritical Care will follow for serial exams one patient is off sedation and rewarmed.     Recommendations:  - Video EEG  - Limit sedation as tolerated  - Avoid hypotonic fluids  - MRI when able  - Neurocritical Care will follow    This patient was seen and discussed with neurocritical care attending, Dr. Guillory.    Vida Yepez MD  Neurology PGY-4  ASCOM: *[id number]    "

## 2021-12-11 NOTE — PLAN OF CARE
Major Shift Events: Pt arrived. MD Kahlil wanted no head CT at this time. Levo, vaso and epi titirated to keep maps greater than 65. Team aware of PH of 7.11 and K of 6.1. Sumner replaced d/t having wrong type of thermistor. Unable to get Spo2 reading.   For vital signs and complete assessments, please see documentation flowsheets.

## 2021-12-11 NOTE — PHARMACY-VANCOMYCIN DOSING SERVICE
"Pharmacy Vancomycin Initial Note  Date of Service 2021  Patient's  1950  71 year old, male    Indication: Aspiration Pneumonia (following cardiac arrest)    Current estimated CrCl = CrCl cannot be calculated (Unknown ideal weight.). ---CRRT started 1211pm    Creatinine for last 3 days  12/10/2021: 11:05 PM Creatinine 1.39 mg/dL  2021:  6:05 AM Creatinine 1.45 mg/dL    Recent Vancomycin Level(s) for last 3 days  No results found for requested labs within last 72 hours.      Vancomycin IV Administrations (past 72 hours)      No vancomycin orders with administrations in past 72 hours.                Nephrotoxins and other renal medications (From now, onward)    Start     Dose/Rate Route Frequency Ordered Stop    21 07  vancomycin 1250 mg in 0.9% NaCl 250 mL intermittent infusion 1,250 mg        \"Followed by\" Linked Group Details    1,250 mg  over 90 Minutes Intravenous EVERY 24 HOURS 21 0655 21 0659    21 0700  vancomycin 1500 mg in 0.9% NaCl 250 ml intermittent infusion 1,500 mg        \"Followed by\" Linked Group Details    1,500 mg  over 90 Minutes Intravenous ONCE 21 0655      21 0630  vasopressin 0.2 units/mL in NS (PITRESSIN) standard conc infusion         0.5-4 Units/hr  2.5-20 mL/hr  Intravenous CONTINUOUS 21 0600      21 0630  piperacillin-tazobactam (ZOSYN) 3.375 g vial to attach to  mL bag        Note to Pharmacy: For [facility], [facility] and [facility]: For Zosyn-naive patients, use the \"Zosyn initial dose + extended infusion\" order panel.    3.375 g  over 30 Minutes Intravenous EVERY 6 HOURS 21 0600 21 0559    21 0559  norepinephrine (LEVOPHED) 16 mg in  mL infusion MAX CONC CENTRAL LINE         0.01-0.6 mcg/kg/min × 70 kg  0.7-39.4 mL/hr  Intravenous CONTINUOUS PRN 21 0600            Contrast Orders - past 72 hours (72h ago, onward)            None                Plan:  1. Start vancomycin 1500 mg IV once, " followed by 1250 mg IV every 24 hours for planned 5 day total course. (Dosed per institution's suggested dosing of ~20 mg/kg q24h and duration of 5 days following cardiac arrest) -----with start of CRRT changed to 1500mg IV q24h  2. Vancomycin monitoring method: Aspiration pneumonia following cardiac arrest  3. Vancomycin therapeutic monitoring goal: 10-15 mg/L  4. Pharmacy will check vancomycin levels as appropriate in 3-5 Days.    5. Serum creatinine levels will be ordered daily for the first week of therapy and at least twice weekly for subsequent weeks.      Hasmukh Gibbs, [facility]  Addended by Kira Kang, PharmD

## 2021-12-12 NOTE — PROGRESS NOTES
Brief Cardiology Note      See note today by Dr. Bradley for full details. Tonight at 2207 Mr. Ariza experienced episode of bradycardia followed by loss of pulses shortly after. Previous to this had had episode of hypotension requiring significant dosages of norepi, epi and vaso which where able to be titrated down. Recent labs show persistantly evlated lactate to 17, pH 7.17 on crrt. O2 375 and CO2 30. No significan auto PEEP on ventilator. Troponin to 10,000 AST ALT flat. Called wife JONAS Stephens during event which required epi 1 mg and atropine 1 mg. In the setting of previous discussion No CPR w/ chest compressions or defibrillation. Kat coming in to bedside to further discuss care.

## 2021-12-12 NOTE — PROGRESS NOTES
Major Shift Events: Pt arrived from cath lab @ 1630. Neuro unchanged, versed still @ 3, fent @ 50. Does not withdraw or follow commands. IABP 1:1, 100%. OG with bloody output as of this AM, MD aware. Lactic cont to climb throughout the night @ 25 currently. CRRT on 2K bath, 500 post bicarb pt required 2amp bicarb while on bicarb post bath. Received 2 FFP, 2 cry, 1 Plt. INR >10, PTT >240, Fibrinogen >61. Gave 1g Calcium, 1g atropine. D50 given x3. Pt received 1L Albumin and 2L LR.     Plan: Pt family aware of pt condition, called to come to bedside this AM  For vital signs and complete assessments, please see documentation flowsheets.

## 2021-12-12 NOTE — PROGRESS NOTES
Brief nephrology note     Pt was transitioned to comfort cares and per chart review passed away.  Nephrology will sign off    Please call with questions    Jillian Garcia MD

## 2021-12-12 NOTE — PROGRESS NOTES
Patient's wife and sisters arrived to bedside and indicated that they want to transition the patient to comfort cares.    CSI placed comfort order set.    Awaiting palliative care arrival to discuss the family's desires plan for withdrawal of cares.    CRRT, warming blanket, thermoguard, swan dc'd.    Will maintain current gtts, balloon pump, vent for now until palliative arrives.

## 2021-12-12 NOTE — PROCEDURES
Procedure Note    PROCEDURES PERFORMED:   1. HD Catheter line placement     PHYSICIANS:  1. Cardiology Fellow: Alexys Bradley MD    PRE-PROCEDURE DIAGNOSIS:   Cardiogenic shock    POST-PROCEDURE DIAGNOSIS:  Cardiogheic shock  Consent obtained with discussion of risks.  All questions were answered. Sterile prep and procedure.    INDICATION:  Mr Ariza is a 71 year old gentleman admitted after OHCA and ROSC achieved. High concerns for cardiogenic shock with EF ~15%, severe lactic acidosis. S/p IABP pump. Found to have multivessel disease and hence no PCI done.      DESCRIPTION:  1. Location: L IJ  2. Access: Local anesthetic with lidocaine.  A  standard (18 g) needle with ultrasound guidance was used to establish vascular access using a modified Seldinger technique.  3. Sheath/catheters: HD Catheter  4. Estimated blood loss of <5 mL.    MEDICATIONS:  1. Local anesthesia with lidocaine.     COMPLICATIONS:  1. None    Procedure done under guidance of Dr Davison.    Alexys Bradley MD  Cardiology Fellow

## 2021-12-12 NOTE — CONSULTS
Brief Palliative note:    Received consult this AM for EOL transition. Comfort care orders were initiated. Patient  prior to my visit. Tried to connect with family x3 in room after his death, ultimately unsuccessful. Per bedside RN, patient was comfortable and death was peaceful.    Starla Chicas, DO  Palliative Medicine   Pager [phone], AMCOM ID [phone]

## 2021-12-12 NOTE — PROGRESS NOTES
CRRT INITIATION NOTE    Consent for CRRT Completed:  YES  Patient s Vascular Access: Catheter              Placement Confirmed:  YES  Manufacture:  Gruvie  Model:  Eulogio  Length/Palauan Size:  11.5Fr x 16cm  Flush Volume:  A 1.2 ml, V 1.3 ml    DATA  Procedure:  CVVHDF  Start Time:  2038  Machine#:  3  Parameters:  Filter:  M150  Blood Flow:  200  mL/min  Replacement Solution:  Sodium Bicarbonate  Replacement Solution Rate:  200 mL/hr   Dialysate Flow Rate:  900 mL/hr   Patient Removal Rate:  0 mL/hr  Anticoagulation Type and Rate:  N/A    ASSESSMENT:   How Patient Tolerated Initiation: Poor   Vital Signs:  T 92.5 (hypothermia protocol), HR 86, BP 59/46 to 134/44   Initial Pressures:    Access:  -51    Filter:  122    Return:  42    TMP:  52    Change in Filter Pressure:  24      INTERVENTIONS:  MAP dropped to 30's with initiation, pressors initially increased and then weaned over next 15 minutes as patient stabalized. MD made aware and at bedside.    PLAN:  Continue with plan of care. Keep close eye on labs and keep renal MD's aware if changes need to be made with post bicarb or switching from 4K to 2K bath.

## 2021-12-12 NOTE — DEATH PRONOUNCEMENT
MD DEATH PRONOUNCEMENT    Called to pronounce Los Ariza dead.    Physical Exam: Unresponsive to noxious stimuli, Breath sounds absent, Carotid pulse absent, Pupillary light reflex absent and Corneal blink reflex absent    Patient was pronounced dead at 09:44 AM, 2021.    Preliminary Cause of Death: Multiorgan failure post cardiac arrest    Active Problems:    Cardiogenic shock (H)       Infectious disease present?: NO    Communicable disease present? (examples: HIV, chicken pox, TB, Ebola, CJD) :  NO    Multi-drug resistant organism present? (example: MRSA): NO    Please consider an autopsy if any of the following exist:  NO Unexpected or unexplained death during or following any dental, medical, or surgical diagnostic treatment procedures.   NO Death of mother at or up to seven days after delivery.     NO All  and pediatric deaths.     NO Death where the cause is sufficiently obscure to delay completion of the death certificate.   NO Deaths in which autopsy would confirm a suspected illness/condition that would affect surviving family members or recipients of transplanted organs.     The following deaths must be reported to the 's Office:  NO A death that may be due entirely or in part to any factors other than natural disease (recent surgery, recent trauma, suspected abuse/neglect).   NO A death that may be an accident, suicide, or homicide.     NO Any sudden, unexpected death in which there is no prior history of significant heart disease or any other condition associated with sudden death.   NO A death under suspicious, unusual, or unexpected circumstances.    NO Any death which is apparently due to natural causes but in which the  does not have a personal physician familiar with the patient s medical history, social, or environmental situation or the circumstances of the terminal event.   NO Any death apparently due to Sudden Infant Death Syndrome.     NO Deaths that  occur during, in association with, or as consequences of a diagnostic, therapeutic, or anesthetic procedure.   NO Any death in which a fracture of a major bone has occurred within the past (6) six months.   NO A death of persons note seen by their physician within 120 days of demise.     NO Any death in which the  was an inmate of a public institution or was in the custody of Law Enforcement personnel.   NO  All unexpected deaths of children   NO Solid organ donors   NO Unidentified bodies   NO Deaths of persons whose bodies are to be cremated or otherwise disposed of so that the bodies will later be unavailable for examination;   NO Deaths unattended by a physician outside of a licensed healthcare facility or licensed residential hospice program   NO Deaths occurring within 24 hours of arrival to a health care facility if death is unexpected.    NO Deaths associated with the decedent s employment.   NO Deaths attributed to acts of terrorism.   NO Any death in which there is uncertainty as to whether it is a medical examiner s care should be discussed with the medical investigator.        Body disposition: Autopsy was discussed with family member:  Spouse in person.  Permission for autopsy was declined.

## 2021-12-12 NOTE — PROGRESS NOTES
Paged regarding need for CRRT initiation.    Patient had presented earlier after out-of-hospital cardiac arrest.  Had progressively increasing pressor requirements and worsening lactic acidosis.  Taken emergently to the Cath Lab and found to have multivessel disease with cardiogenic shock.  An IABP has been inserted.  Urine output has declined.  Most recent arterial pH was 7.10 in the setting of mixed respiratory and metabolic acidosis with rising lactate.    -Will initiate CRRT, 4K baths, I=O's, 25 ml/kg/hr clearance, post-filter bicarb  -Consent already obtained from patient's wife by Dr. Radha Kent MD  Renal Fellow  [phone]      Addendum 12.27 am    Notified that lactate continues to rise despite initiation of CRRT with most recent level 21. Most recent K also higher at 5.3.    -Increased post-filter bicarb to 500 ml/hr  -Switched to 2K baths  -Clearance increased to 35    Discussed with ICU team    Griffin Kent MD  Renal Fellow  [phone]

## 2021-12-12 NOTE — PROGRESS NOTES
[facility], Procedure Note          Extubation:       Los Ariza  MRN# [id number]   2021, 9:54 AM         Patient extubated at: 2021, 9:54 AM   Supplemental Oxygen: Via RA .21   Cough: The cough is none   Secretion Mode:    Secretion Amount: none amount, moderately thick and white / yellow in color   Respiratory Exam:: Breath sounds: none       Location: bilaterally   Skin Exam:: Patient color: mottled   Patient Status: Currently does not respond   Arterial Blood Gasses: pH Arterial (no units)   Date Value   2021 7.14 (LL)     pO2 Arterial (mm Hg)   Date Value   2021 145 (H)     pCO2 Arterial (mm Hg)   Date Value   2021 49 (H)     Bicarbonate Arterial (mmol/L)   Date Value   2021 17 (L)            Recorded by Nithya Dillard

## 2021-12-12 NOTE — DISCHARGE SUMMARY
Brief summary:  Lso Ariza is a 71 year old male who initially was admitted for management after cardiac arrest. The patient was shoveling snow at home when he had a witnessed cardiac arrest by wife. His wife began CPR immediately for 3-4 minutes before PD arrived. Police performed CPR for another 3-4 minutes before EMS arrival. EMS gave 2 shocks and had 3 unadvised shocks. EMS also gave 3 rounds of Epi and continued CPR for 10-12 minutes. Patient was then intubated, cooled with a thermaguard and had a femoral arterial line placed. [facility] was contacted and did not recommend cath lab at the time based on EKG, transferred to [facility] for management of the arrest.    Yesterday, pt had worsening acidosis with pH ~7.1 and rising lactic acidosis (1-->7). Not responding to bicarb pushes. Respiatory acidosis also worsening despite going up on ventilation. Reviewed stat TTE images and noted to have low EF ~15%. Cath lab immediately activated and pt underwent coronary angiogram. Found to have multivessel disease (100% RCA< 80% mid LAD, 70% Proximal RCA) hence no PCI done. Had IABP and [place] Christiano placed in cath lab. Came back to floor and had HD catheter placed in L internal jugular. CRRT orders in by Renal. Worsening pH and lactic acidosis despite aggressive management. He also was noted to be in shock liver.    Given multiorgan failure and terrible prognosis, family decided to move patient to comfort cares this morning. Shortly after this intervention, patient was declared death at 9:44am.    Jorge Reyes Castro, MD  Cardiology fellow

## 2021-12-12 NOTE — PROGRESS NOTES
Brief Cardiology Progress Note     ASSESSMENT:   See H/P by Dr Esquivel from today for initial details. During the day pt had worsening acidosis with pH ~7.1 and rising lactic acidosis (1-->7). Not responding to bicarb pushes. Respiatory acidosis also worsening despite going up on ventilation. Reviewed stat TTE images and noted to have low EF ~15%. Cath lab immediately activated and pt underwent coronary angiogram. Found to have multivessel disease (100% RCA< 80% mid LAD, 70% Proximal RCA) hence no PCI done. Had IABP and [place] Christiano placed in cath lab. Came back to floor and had HD catheter placed in L internal jugular. CRRT orders in by Renal. Worsening pH and lactic acidosis despite aggressive management. Now noted to be in shock liver as well. Remained on varying doses of NE, Epi and Vaso throughout the day.  Wife updated throughout the day in person and over the phone. Pt is DNR, orders in.    Patient seen and discussed with , who agrees with above plan.      Thank you for consulting the cardiovascular services at the [facility]. Please do not hesitate to call us with any questions.     Alexys Bradley MD  Cardiology Fellow  Pager: [phone]

## 2021-12-12 NOTE — PROGRESS NOTES
"SPIRITUAL HEALTH SERVICES  SPIRITUAL ASSESSMENT Progress Note  [facility] (Means) 4C   ON-CALL VISIT    REFERRAL SOURCE: [facility] consult for emotional support.     Nigel identifies as Undesignated. His sisters and wife were in attendance with him at beside and have decided that he transition to comfort cares today.     Family stated that, \"We are good with our decision and we believe that this is what he would have wanted\".     They welcomed prayers of peace and comfort for Nigel. We prayed together at their request.     PLAN: Unit  will be notified for follow up.     Gwen Hopper  Lead Confucianist   Pager [phone]    [facility] remains available 24/7 for emergent requests/referrals, either by having the switchboard page the on-call  or by entering an ASAP/STAT consult in Epic (this will also page the on-call ).    "

## 2021-12-12 NOTE — PROGRESS NOTES
Asystole per cardiac monitor and balloon pump.      0944 12/12/21 patient pronounced dead by Dr Reyes Castro    Family declines autopsy.  [facility] contacted with time of death.

## 2021-12-13 LAB
ATRIAL RATE - MUSE: 113 BPM
ATRIAL RATE - MUSE: 113 BPM
ATRIAL RATE - MUSE: 80 BPM
ATRIAL RATE - MUSE: 80 BPM
ATRIAL RATE - MUSE: 91 BPM
DIASTOLIC BLOOD PRESSURE - MUSE: NORMAL MMHG
INTERPRETATION ECG - MUSE: NORMAL
INTERPRETATION TEGPIA: NORMAL
LACTATE BLD-SCNC: 10.8 MMOL/L
P AXIS - MUSE: 75 DEGREES
P AXIS - MUSE: 78 DEGREES
P AXIS - MUSE: 80 DEGREES
P AXIS - MUSE: 92 DEGREES
P AXIS - MUSE: 93 DEGREES
PA AA BLD-ACNC: 96 %
PA ADP BLD-ACNC: 88 %
PCO2 BLDV: 96 MM HG (ref 40–50)
PH BLDV: <7 [PH] (ref 7.32–7.43)
PO2 BLDV: 140 MM HG (ref 25–47)
PR INTERVAL - MUSE: 222 MS
PR INTERVAL - MUSE: 226 MS
PR INTERVAL - MUSE: 228 MS
PR INTERVAL - MUSE: 240 MS
PR INTERVAL - MUSE: 248 MS
QRS DURATION - MUSE: 102 MS
QRS DURATION - MUSE: 104 MS
QRS DURATION - MUSE: 104 MS
QRS DURATION - MUSE: 106 MS
QRS DURATION - MUSE: 96 MS
QT - MUSE: 346 MS
QT - MUSE: 354 MS
QT - MUSE: 364 MS
QT - MUSE: 384 MS
QT - MUSE: 394 MS
QTC - MUSE: 442 MS
QTC - MUSE: 447 MS
QTC - MUSE: 454 MS
QTC - MUSE: 474 MS
QTC - MUSE: 485 MS
R AXIS - MUSE: 78 DEGREES
R AXIS - MUSE: 78 DEGREES
R AXIS - MUSE: 79 DEGREES
R AXIS - MUSE: 86 DEGREES
R AXIS - MUSE: 87 DEGREES
SYSTOLIC BLOOD PRESSURE - MUSE: NORMAL MMHG
T AXIS - MUSE: -11 DEGREES
T AXIS - MUSE: -9 DEGREES
T AXIS - MUSE: 265 DEGREES
T AXIS - MUSE: 267 DEGREES
T AXIS - MUSE: 52 DEGREES
VENTRICULAR RATE- MUSE: 113 BPM
VENTRICULAR RATE- MUSE: 113 BPM
VENTRICULAR RATE- MUSE: 80 BPM
VENTRICULAR RATE- MUSE: 80 BPM
VENTRICULAR RATE- MUSE: 91 BPM

## 2021-12-14 LAB
NSE SERPL IA-MCNC: 46.5 NG/ML
S100 CA BINDING PROTEIN B SER-MCNC: 201 NG/L
S100 CA BINDING PROTEIN B SER-MCNC: 269 NG/L

## 2021-12-15 LAB
NSE SERPL IA-MCNC: 37.5 NG/ML
NSE SERPL IA-MCNC: 73.8 NG/ML

## 2021-12-16 LAB
BACTERIA BLD CULT: NO GROWTH
BACTERIA BLD CULT: NO GROWTH

## 2021-12-22 LAB — S100 CA BINDING PROTEIN B SER-MCNC: 4667 NG/L

## 2022-01-04 LAB
7AMINOCLONAZEPAM SERPL-MCNC: NEGATIVE NG/ML
ALPRAZ SERPL-MCNC: NEGATIVE NG/ML
AMPHET BLD CFM-MCNC: NEGATIVE NG/ML
APAP BLD-MCNC: NEGATIVE UG/ML
BARBITURATES SPEC-MCNC: NEGATIVE UG/ML
BENZODIAZ SPEC QL: POSITIVE
BENZODIAZ SPEC-MCNC: ABNORMAL NG/ML
BUPRENORPHINE SERPL-MCNC: NEGATIVE NG/ML
BZE BLD CFM-MCNC: NEGATIVE NG/ML
CARBOXYTHC BLD-MCNC: NEGATIVE NG/ML
CARISOPRODOL IA: NEGATIVE UG/ML
CHLORDIAZEP SERPL-MCNC: NEGATIVE NG/ML
CLONAZEPAM SERPL-MCNC: NEGATIVE NG/ML
DECLARED MEDICATIONS: ABNORMAL
DESALKYLFLURAZ SERPL CFM-MCNC: NEGATIVE NG/ML
DIAZEPAM SERPL-MCNC: NEGATIVE NG/ML
DRUGS FLD: ABNORMAL
ETHANOL BLD-MCNC: NEGATIVE GM/DL
FENTANYL BLD CFM-MCNC: 0.4 NG/ML
FENTANYL IA: ABNORMAL NG/ML
FENTANYL SPEC QL: POSITIVE
FLURAZEPAM SPEC-MCNC: NEGATIVE NG/ML
GABAPENTIN IA: NEGATIVE UG/ML
LORAZEPAM SERPL-MCNC: NEGATIVE NG/ML
MEPERIDINE SERPLBLD-MCNC: NEGATIVE NG/ML
METHADONE SAL CFM-MCNC: NEGATIVE NG/ML
MIDAZOLAM SERPL-MCNC: 9.9 NG/ML
NORCHLORDIAZEP SERPL-MCNC: NEGATIVE NG/ML
NORDIAZEPAM SPEC-MCNC: NEGATIVE NG/ML
NORFENTANYL BLD CFM-MCNC: NEGATIVE NG/ML
OPIATES SPEC-MCNC: NEGATIVE NG/ML
OXAZEPAM SERPL CFM-MCNC: NEGATIVE NG/ML
OXYCODONE SERPLBLD SCN-MCNC: NEGATIVE NG/ML
PCP SPEC-MCNC: NEGATIVE NG/ML
PROPOXYPH SPEC-MCNC: NEGATIVE NG/ML
TEMAZEPAM SERPL-MCNC: NEGATIVE NG/ML
TRAMADOL BLD-MCNC: NEGATIVE NG/ML
TRIAZOLAM SPEC-MCNC: NEGATIVE NG/ML

## (undated) DEVICE — INTRODUCER SHEATH FAST-CATH CATH-LOCK 7FRX12CM 406702

## (undated) DEVICE — INTRO SHEATH 6FRX25CM PINNACLE RSS606

## (undated) DEVICE — PACK HEART LEFT CUSTOM

## (undated) DEVICE — INTRO SHEATH 7FRX10CM PINNACLE RSS702

## (undated) DEVICE — CATH ANGIO SUPERTORQUE PLUS JL4 6FRX100CM 533620

## (undated) DEVICE — INTRO SHEATH 9FRX10CM PINNACLE RSS902

## (undated) DEVICE — CATH ANGIO INFINITI 3DRC 6FRX100CM 534676T

## (undated) DEVICE — CATH ANGIO INFINITI PIGTAIL 145 6 SH 6FRX110CM  534-652S

## (undated) DEVICE — Device

## (undated) DEVICE — KIT HAND CONTROL ACIST 014644 AR-P54